# Patient Record
Sex: MALE | Race: WHITE | NOT HISPANIC OR LATINO
[De-identification: names, ages, dates, MRNs, and addresses within clinical notes are randomized per-mention and may not be internally consistent; named-entity substitution may affect disease eponyms.]

---

## 2018-09-27 ENCOUNTER — APPOINTMENT (OUTPATIENT)
Dept: ORTHOPEDIC SURGERY | Facility: CLINIC | Age: 73
End: 2018-09-27
Payer: COMMERCIAL

## 2018-09-27 VITALS
HEART RATE: 55 BPM | SYSTOLIC BLOOD PRESSURE: 125 MMHG | DIASTOLIC BLOOD PRESSURE: 85 MMHG | HEIGHT: 68 IN | WEIGHT: 160 LBS | BODY MASS INDEX: 24.25 KG/M2

## 2018-09-27 DIAGNOSIS — Z85.72 PERSONAL HISTORY OF NON-HODGKIN LYMPHOMAS: ICD-10-CM

## 2018-09-27 DIAGNOSIS — Z86.39 PERSONAL HISTORY OF OTHER ENDOCRINE, NUTRITIONAL AND METABOLIC DISEASE: ICD-10-CM

## 2018-09-27 DIAGNOSIS — Z80.9 FAMILY HISTORY OF MALIGNANT NEOPLASM, UNSPECIFIED: ICD-10-CM

## 2018-09-27 DIAGNOSIS — Z87.891 PERSONAL HISTORY OF NICOTINE DEPENDENCE: ICD-10-CM

## 2018-09-27 DIAGNOSIS — Z86.19 PERSONAL HISTORY OF OTHER INFECTIOUS AND PARASITIC DISEASES: ICD-10-CM

## 2018-09-27 PROCEDURE — 73502 X-RAY EXAM HIP UNI 2-3 VIEWS: CPT

## 2018-09-27 PROCEDURE — 99214 OFFICE O/P EST MOD 30 MIN: CPT

## 2018-09-27 PROCEDURE — 72100 X-RAY EXAM L-S SPINE 2/3 VWS: CPT

## 2018-10-29 ENCOUNTER — APPOINTMENT (OUTPATIENT)
Dept: ORTHOPEDIC SURGERY | Facility: CLINIC | Age: 73
End: 2018-10-29
Payer: COMMERCIAL

## 2018-10-29 VITALS — WEIGHT: 160 LBS | BODY MASS INDEX: 24.25 KG/M2 | HEIGHT: 68 IN

## 2018-10-29 PROCEDURE — 73502 X-RAY EXAM HIP UNI 2-3 VIEWS: CPT

## 2018-10-29 PROCEDURE — 20610 DRAIN/INJ JOINT/BURSA W/O US: CPT | Mod: RT

## 2018-10-29 PROCEDURE — 72100 X-RAY EXAM L-S SPINE 2/3 VWS: CPT

## 2018-10-29 PROCEDURE — 99214 OFFICE O/P EST MOD 30 MIN: CPT | Mod: 25

## 2018-12-12 ENCOUNTER — APPOINTMENT (OUTPATIENT)
Dept: ORTHOPEDIC SURGERY | Facility: CLINIC | Age: 73
End: 2018-12-12
Payer: COMMERCIAL

## 2018-12-12 VITALS — WEIGHT: 160 LBS | BODY MASS INDEX: 24.25 KG/M2 | HEIGHT: 68 IN

## 2018-12-12 PROCEDURE — 99213 OFFICE O/P EST LOW 20 MIN: CPT

## 2019-03-14 ENCOUNTER — APPOINTMENT (OUTPATIENT)
Dept: ORTHOPEDIC SURGERY | Facility: CLINIC | Age: 74
End: 2019-03-14
Payer: COMMERCIAL

## 2019-03-14 VITALS — HEIGHT: 68 IN | WEIGHT: 160 LBS | BODY MASS INDEX: 24.25 KG/M2

## 2019-03-14 DIAGNOSIS — M70.61 TROCHANTERIC BURSITIS, RIGHT HIP: ICD-10-CM

## 2019-03-14 PROCEDURE — 99213 OFFICE O/P EST LOW 20 MIN: CPT

## 2021-12-29 ENCOUNTER — APPOINTMENT (OUTPATIENT)
Dept: ORTHOPEDIC SURGERY | Facility: CLINIC | Age: 76
End: 2021-12-29
Payer: COMMERCIAL

## 2021-12-29 PROCEDURE — 72170 X-RAY EXAM OF PELVIS: CPT

## 2021-12-29 PROCEDURE — 72100 X-RAY EXAM L-S SPINE 2/3 VWS: CPT

## 2021-12-29 PROCEDURE — 99214 OFFICE O/P EST MOD 30 MIN: CPT

## 2021-12-29 NOTE — DISCUSSION/SUMMARY
[de-identified] : I discussed the underlying pathophysiology of the patient's condition in great detail with the patient. I went over the patient's x-rays with them in great detail. The extent of the patient’s arthritis was discussed in great detail with them. I advised him to cut back on the bowling for now. When he returns from [place], he will start a course of physical therapy for strengthening and flexibility. A prescription was provided dated 01/13/2022. All of his questions were answered. He understands and consents to the plan.\par \par FU 6 weeks.\par after undergoing PT for his low back and left hip.
none

## 2021-12-29 NOTE — PHYSICAL EXAM
[LE] : Sensory: Intact in bilateral lower extremities [de-identified] : Constitutional\par o Appearance : well-nourished, well developed, alert, in no acute distress \par Head and Face\par o Head :\par ¦ Inspection : atraumatic, normocephalic\par o Face :\par ¦ Inspection : no visible rash or discoloration\par Respiratory\par o Respiratory Effort: breathing unlabored \par Neurologic\par o Mental Status Examination :\par ¦ Orientation : alert and oriented X 3\par Psychiatric\par o Mood and Affect: mood normal, affect appropriate\par Cardiovascular\par o Observation/Palpation : - no swelling\par Lymphatic\par o Additional Nodes : No palpable lymph nodes present\par \par Lumbosacral Spine\par o Inspection : well-healed shingles lesions- right side\par o Palpation : paraspinal musculature nontender, left SI joint tenderness \par o Range of Motion : extension reproduces his pain, sidebending to the right causes left hip pain, sidebending to the right does not\par o Muscle Strength : paraspinal muscle strength and tone within normal limits\par o Muscle Tone : paraspinal muscle strength and tone within normal limits\par o Tests: straight leg test negative, moshe tests negative \par  \par Right Lower Extremity\par o Buttock : no tenderness, no swelling or deformities, well-healed shingles lesions\par o Right Hip :\par ¦ Inspection/Palpation : no trochanteric tenderness, no swelling or deformities\par ¦ Range of Motion : full flexion, limited rotation, no crepitance\par ¦ Stability : joint stability intact\par ¦ Strength : extension, flexion 5/5, adduction, abduction 5/5, internal rotation and external rotation, 5/5\par \par Left Lower Extremity\par o Buttock : no tenderness, swelling or deformities \par o Left Hip :\par ¦ Inspection/Palpation : greater trochanteric tenderness, no ITB tenderness, no swelling or deformities\par ¦ Range of Motion : full flexion, limited rotation, no crepitance\par ¦ Stability : joint stability intact\par ¦ Strength : extension, flexion 4-/5, adduction, abduction 4-/5 and painful, internal rotation and external rotation, 4-/5\par  \par Gait: kyphotic gait with an abductor lurch to the left with every step, no significant extremity swelling or lymphedema, good core strength, tight hamstrings bilaterally, equal leg lengths, normal sensation to light touch\par \par Radiology Results\par o Lumbosacral Spine : AP and lateral views were obtained and revealed diffuse degenerative disc disease worse at L2/L3 where there are significant anterior osteophytes and endplate sclerosis. Diffuse facet arthropathy with foraminal stenosis at L4/L5 and L5/S1. Peripheral osteophytes diffusely worst at L1/L2 and L2/L3. \par o Pelvis: AP pelvis was obtained and revealed mild degenerative arthritis of both hips.

## 2021-12-29 NOTE — ADDENDUM
[FreeTextEntry1] : I, Jonas Patricia, acted solely as a scribe for Dr. Martin Givens on this date 12/29/2021.\par All medical record entries made by the Scribe were at my, Dr. Martin Givens, direction and personally dictated by me on 12/29/2021. I have reviewed the chart and agree that the record accurately reflects my personal performance of the history, physical exam, assessment and plan. I have also personally directed, reviewed, and agreed with the chart.

## 2021-12-29 NOTE — HISTORY OF PRESENT ILLNESS
[de-identified] : 76 year old male presents complaining of left lateral hip pain ongoing for 2 months and chronic low back pain and tightness. He denies a specific injury. He has been bowling with a 16 pound ball. He notes occasional pain into the lateral thigh. It does not pass the knee. He denies groin pain. He denies any numbness or tingling in his legs. He notes difficulty walking long distances and ascending stairs. He denies that the pain wakes him from sleep. He has a hx of lumbar spine arthritis and stenosis and was treated with PT in the past.  Of Note: He is going to [place] from 01/06/22 to 01/12/22. \par Pmhx of DM. He is COVID boosted.

## 2022-03-14 ENCOUNTER — APPOINTMENT (OUTPATIENT)
Dept: ORTHOPEDIC SURGERY | Facility: CLINIC | Age: 77
End: 2022-03-14
Payer: COMMERCIAL

## 2022-03-14 VITALS — HEIGHT: 68 IN | BODY MASS INDEX: 23.49 KG/M2 | WEIGHT: 155 LBS

## 2022-03-14 DIAGNOSIS — M47.816 SPONDYLOSIS W/OUT MYELOPATHY OR RADICULOPATHY, LUMBAR REGION: ICD-10-CM

## 2022-03-14 PROCEDURE — 20610 DRAIN/INJ JOINT/BURSA W/O US: CPT | Mod: LT

## 2022-03-14 PROCEDURE — 99214 OFFICE O/P EST MOD 30 MIN: CPT | Mod: 25

## 2022-03-14 NOTE — DISCUSSION/SUMMARY
[de-identified] : I went over the pathophysiology of the patient's symptoms in great detail with the patient. The patient elected to receive a cortisone injection into his left hip greater trochanteric bursa today and tolerated it well. I instructed the patient on ROM exercises and told them to take it easy. The use of ice and rest was reviewed with the patient. The patient may resume activities tomorrow. I am recommending the patient continues going to physical therapy to obtain increases in his strength and mobility. A prescription was provided. If he does not improve with this injection and another month of PT an MRI will be considered. All of his questions were answered. He understands and consents to the plan.\par \par FU 1 month.\par after a left hip greater trochanteric bursa cortisone injection today (03/14/2022).\par after continuing PT for his left hip and low back.

## 2022-03-14 NOTE — HISTORY OF PRESENT ILLNESS
[de-identified] : 77 year old male presents complaining of left lateral hip pain ongoing since October, and chronic low back pain and tightness. He denies a specific injury. He has been bowling with a 16 pound ball. He notes occasional pain into the lateral thigh. It does not pass the knee. He denies groin pain. He denies any numbness or tingling in his legs. He denies that the pain wakes him from sleep. He was last seen on 12/29/21 and went to 10 PT sessions since. He is doing worse and now has to use a cane. He notes difficulty standing up from a chair and going up and down stairs. He notes his hip feels swollen.\par Pmhx of DM. He is COVID boosted. \par \par Radiology Results taken on 12/29/2021:\par o Lumbosacral Spine : AP and lateral views were obtained and revealed diffuse degenerative disc disease worse at L2/L3 where there are significant anterior osteophytes and endplate sclerosis. Diffuse facet arthropathy with foraminal stenosis at L4/L5 and L5/S1. Peripheral osteophytes diffusely worst at L1/L2 and L2/L3. \par o Pelvis: AP pelvis was obtained and revealed mild degenerative arthritis of both hips.

## 2022-03-14 NOTE — PHYSICAL EXAM
[LE] : Sensory: Intact in bilateral lower extremities [de-identified] : Constitutional\par o Appearance : well-nourished, well developed, alert, in no acute distress \par Head and Face\par o Head :\par ¦ Inspection : atraumatic, normocephalic\par o Face :\par ¦ Inspection : no visible rash or discoloration\par Respiratory\par o Respiratory Effort: breathing unlabored \par Neurologic\par o Mental Status Examination :\par ¦ Orientation : alert and oriented X 3\par Psychiatric\par o Mood and Affect: mood normal, affect appropriate\par Cardiovascular\par o Observation/Palpation : - no swelling\par Lymphatic\par o Additional Nodes : No palpable lymph nodes present\par \par Lumbosacral Spine\par o Inspection : well-healed shingles lesions- right side\par o Palpation : paraspinal musculature nontender, left SI joint and sciatic notch tenderness \par o Range of Motion : extension reproduces his pain and flexion relieves it, sidebending to the left is worse than to the right\par o Muscle Strength : paraspinal muscle strength and tone within normal limits\par o Muscle Tone : paraspinal muscle strength and tone within normal limits\par o Tests: straight leg test negative, moshe tests negative \par  \par Right Lower Extremity\par o Buttock : no tenderness, no swelling or deformities, well-healed shingles lesions\par o Right Hip :\par ¦ Inspection/Palpation : no trochanteric or ITB tenderness, no swelling or deformities\par ¦ Range of Motion : full flexion and rotation, no crepitance\par ¦ Stability : joint stability intact\par ¦ Strength : extension, flexion 5/5, adduction, abduction 5/5, internal rotation and external rotation\par \par Left Lower Extremity\par o Buttock : no tenderness, swelling or deformities \par o Left Hip :\par ¦ Inspection/Palpation : greater trochanteric tenderness, IT band tenderness, no swelling or deformities\par ¦ Range of Motion : full flexion, rotation causes pain in the left hip, no crepitance\par ¦ Stability : joint stability intact\par ¦ Strength : extension, flexion 5/5, adduction, abduction 5/5 but painful, internal rotation and external rotation\par  \par Gait: kyphotic gait with an abductor lurch to the left with every step, ambulating with a cane, no significant extremity swelling or lymphedema, good core strength, tight hamstrings bilaterally, equal leg lengths, normal sensation to light touch\par \par o Left Hip injection: Anatomic location- left hip greater trochanteric bursa, Spray - area was sterilized with Betadine and alcohol and anesthetized with Ethyl Chloride , needle used-20G, Medications given- 1.5 cc's lidocaine, 0.5cc's kenalog, 0.5 cc's dexamethasone, and patient tolerated it well.

## 2022-03-14 NOTE — ADDENDUM
[FreeTextEntry1] : I, Jonas Patricia, acted solely as a scribe for Dr. Martin Givens on this date 03/14/2022.\par All medical record entries made by the Scribe were at my, Dr. Martin Givens, direction and personally dictated by me on 03/14/2022. I have reviewed the chart and agree that the record accurately reflects my personal performance of the history, physical exam, assessment and plan. I have also personally directed, reviewed, and agreed with the chart.

## 2022-04-25 ENCOUNTER — APPOINTMENT (OUTPATIENT)
Dept: ORTHOPEDIC SURGERY | Facility: CLINIC | Age: 77
End: 2022-04-25
Payer: COMMERCIAL

## 2022-04-25 DIAGNOSIS — M48.061 SPINAL STENOSIS, LUMBAR REGION WITHOUT NEUROGENIC CLAUDICATION: ICD-10-CM

## 2022-04-25 PROCEDURE — 99213 OFFICE O/P EST LOW 20 MIN: CPT

## 2022-07-17 ENCOUNTER — INPATIENT (INPATIENT)
Facility: HOSPITAL | Age: 77
LOS: 2 days | Discharge: HOME | DRG: 690 | End: 2022-07-20
Attending: FAMILY MEDICINE | Admitting: HOSPITALIST
Payer: MEDICARE

## 2022-07-17 VITALS
HEART RATE: 100 BPM | WEIGHT: 154.98 LBS | OXYGEN SATURATION: 92 % | TEMPERATURE: 101 F | SYSTOLIC BLOOD PRESSURE: 129 MMHG | RESPIRATION RATE: 20 BRPM | DIASTOLIC BLOOD PRESSURE: 73 MMHG

## 2022-07-17 DIAGNOSIS — Z98.89 OTHER SPECIFIED POSTPROCEDURAL STATES: Chronic | ICD-10-CM

## 2022-07-17 DIAGNOSIS — J18.9 PNEUMONIA, UNSPECIFIED ORGANISM: ICD-10-CM

## 2022-07-17 LAB
ALBUMIN SERPL ELPH-MCNC: 3.8 G/DL — SIGNIFICANT CHANGE UP (ref 3.3–5.2)
ALP SERPL-CCNC: 94 U/L — SIGNIFICANT CHANGE UP (ref 40–120)
ALT FLD-CCNC: 26 U/L — SIGNIFICANT CHANGE UP
ANION GAP SERPL CALC-SCNC: 16 MMOL/L — SIGNIFICANT CHANGE UP (ref 5–17)
APPEARANCE UR: CLEAR — SIGNIFICANT CHANGE UP
AST SERPL-CCNC: 33 U/L — SIGNIFICANT CHANGE UP
BACTERIA # UR AUTO: ABNORMAL
BASOPHILS # BLD AUTO: 0.03 K/UL — SIGNIFICANT CHANGE UP (ref 0–0.2)
BASOPHILS NFR BLD AUTO: 0.3 % — SIGNIFICANT CHANGE UP (ref 0–2)
BILIRUB SERPL-MCNC: 0.7 MG/DL — SIGNIFICANT CHANGE UP (ref 0.4–2)
BILIRUB UR-MCNC: NEGATIVE — SIGNIFICANT CHANGE UP
BUN SERPL-MCNC: 27.9 MG/DL — HIGH (ref 8–20)
CALCIUM SERPL-MCNC: 9.8 MG/DL — SIGNIFICANT CHANGE UP (ref 8.6–10.2)
CHLORIDE SERPL-SCNC: 95 MMOL/L — LOW (ref 98–107)
CO2 SERPL-SCNC: 20 MMOL/L — LOW (ref 22–29)
COLOR SPEC: YELLOW — SIGNIFICANT CHANGE UP
CREAT SERPL-MCNC: 1.74 MG/DL — HIGH (ref 0.5–1.3)
DIFF PNL FLD: ABNORMAL
EGFR: 39 ML/MIN/1.73M2 — LOW
EOSINOPHIL # BLD AUTO: 0.02 K/UL — SIGNIFICANT CHANGE UP (ref 0–0.5)
EOSINOPHIL NFR BLD AUTO: 0.2 % — SIGNIFICANT CHANGE UP (ref 0–6)
EPI CELLS # UR: SIGNIFICANT CHANGE UP
GLUCOSE SERPL-MCNC: 154 MG/DL — HIGH (ref 70–99)
GLUCOSE UR QL: 1000 MG/DL
HCT VFR BLD CALC: 42.5 % — SIGNIFICANT CHANGE UP (ref 39–50)
HGB BLD-MCNC: 13.7 G/DL — SIGNIFICANT CHANGE UP (ref 13–17)
HMPV RNA SPEC QL NAA+PROBE: DETECTED
IMM GRANULOCYTES NFR BLD AUTO: 0.6 % — SIGNIFICANT CHANGE UP (ref 0–1.5)
KETONES UR-MCNC: ABNORMAL
LACTATE BLDV-MCNC: 1.3 MMOL/L — SIGNIFICANT CHANGE UP (ref 0.5–2)
LEUKOCYTE ESTERASE UR-ACNC: ABNORMAL
LIDOCAIN IGE QN: 22 U/L — SIGNIFICANT CHANGE UP (ref 22–51)
LYMPHOCYTES # BLD AUTO: 0.81 K/UL — LOW (ref 1–3.3)
LYMPHOCYTES # BLD AUTO: 8 % — LOW (ref 13–44)
MAGNESIUM SERPL-MCNC: 2.1 MG/DL — SIGNIFICANT CHANGE UP (ref 1.8–2.6)
MCHC RBC-ENTMCNC: 28.9 PG — SIGNIFICANT CHANGE UP (ref 27–34)
MCHC RBC-ENTMCNC: 32.2 GM/DL — SIGNIFICANT CHANGE UP (ref 32–36)
MCV RBC AUTO: 89.7 FL — SIGNIFICANT CHANGE UP (ref 80–100)
MONOCYTES # BLD AUTO: 0.78 K/UL — SIGNIFICANT CHANGE UP (ref 0–0.9)
MONOCYTES NFR BLD AUTO: 7.7 % — SIGNIFICANT CHANGE UP (ref 2–14)
NEUTROPHILS # BLD AUTO: 8.38 K/UL — HIGH (ref 1.8–7.4)
NEUTROPHILS NFR BLD AUTO: 83.2 % — HIGH (ref 43–77)
NITRITE UR-MCNC: NEGATIVE — SIGNIFICANT CHANGE UP
NT-PROBNP SERPL-SCNC: 716 PG/ML — HIGH (ref 0–300)
PH UR: 6 — SIGNIFICANT CHANGE UP (ref 5–8)
PLATELET # BLD AUTO: 242 K/UL — SIGNIFICANT CHANGE UP (ref 150–400)
POTASSIUM SERPL-MCNC: 4.6 MMOL/L — SIGNIFICANT CHANGE UP (ref 3.5–5.3)
POTASSIUM SERPL-SCNC: 4.6 MMOL/L — SIGNIFICANT CHANGE UP (ref 3.5–5.3)
PROT SERPL-MCNC: 7.6 G/DL — SIGNIFICANT CHANGE UP (ref 6.6–8.7)
PROT UR-MCNC: 30 MG/DL
RAPID RVP RESULT: DETECTED
RBC # BLD: 4.74 M/UL — SIGNIFICANT CHANGE UP (ref 4.2–5.8)
RBC # FLD: 13.7 % — SIGNIFICANT CHANGE UP (ref 10.3–14.5)
RBC CASTS # UR COMP ASSIST: ABNORMAL /HPF (ref 0–4)
SARS-COV-2 RNA SPEC QL NAA+PROBE: SIGNIFICANT CHANGE UP
SODIUM SERPL-SCNC: 131 MMOL/L — LOW (ref 135–145)
SP GR SPEC: 1.01 — SIGNIFICANT CHANGE UP (ref 1.01–1.02)
TROPONIN T SERPL-MCNC: <0.01 NG/ML — SIGNIFICANT CHANGE UP (ref 0–0.06)
UROBILINOGEN FLD QL: NEGATIVE MG/DL — SIGNIFICANT CHANGE UP
WBC # BLD: 10.08 K/UL — SIGNIFICANT CHANGE UP (ref 3.8–10.5)
WBC # FLD AUTO: 10.08 K/UL — SIGNIFICANT CHANGE UP (ref 3.8–10.5)
WBC UR QL: ABNORMAL /HPF (ref 0–5)

## 2022-07-17 PROCEDURE — 71045 X-RAY EXAM CHEST 1 VIEW: CPT | Mod: 26

## 2022-07-17 PROCEDURE — 99285 EMERGENCY DEPT VISIT HI MDM: CPT

## 2022-07-17 PROCEDURE — 99223 1ST HOSP IP/OBS HIGH 75: CPT

## 2022-07-17 RX ORDER — DEXTROSE 50 % IN WATER 50 %
25 SYRINGE (ML) INTRAVENOUS ONCE
Refills: 0 | Status: DISCONTINUED | OUTPATIENT
Start: 2022-07-17 | End: 2022-07-20

## 2022-07-17 RX ORDER — SODIUM CHLORIDE 9 MG/ML
500 INJECTION INTRAMUSCULAR; INTRAVENOUS; SUBCUTANEOUS ONCE
Refills: 0 | Status: COMPLETED | OUTPATIENT
Start: 2022-07-17 | End: 2022-07-17

## 2022-07-17 RX ORDER — SODIUM CHLORIDE 9 MG/ML
1000 INJECTION, SOLUTION INTRAVENOUS
Refills: 0 | Status: DISCONTINUED | OUTPATIENT
Start: 2022-07-17 | End: 2022-07-20

## 2022-07-17 RX ORDER — DEXTROSE 50 % IN WATER 50 %
15 SYRINGE (ML) INTRAVENOUS ONCE
Refills: 0 | Status: DISCONTINUED | OUTPATIENT
Start: 2022-07-17 | End: 2022-07-20

## 2022-07-17 RX ORDER — ONDANSETRON 8 MG/1
4 TABLET, FILM COATED ORAL EVERY 8 HOURS
Refills: 0 | Status: DISCONTINUED | OUTPATIENT
Start: 2022-07-17 | End: 2022-07-20

## 2022-07-17 RX ORDER — INSULIN LISPRO 100/ML
VIAL (ML) SUBCUTANEOUS
Refills: 0 | Status: DISCONTINUED | OUTPATIENT
Start: 2022-07-17 | End: 2022-07-20

## 2022-07-17 RX ORDER — DEXTROSE 50 % IN WATER 50 %
12.5 SYRINGE (ML) INTRAVENOUS ONCE
Refills: 0 | Status: DISCONTINUED | OUTPATIENT
Start: 2022-07-17 | End: 2022-07-20

## 2022-07-17 RX ORDER — ENOXAPARIN SODIUM 100 MG/ML
40 INJECTION SUBCUTANEOUS EVERY 24 HOURS
Refills: 0 | Status: DISCONTINUED | OUTPATIENT
Start: 2022-07-17 | End: 2022-07-20

## 2022-07-17 RX ORDER — GLUCAGON INJECTION, SOLUTION 0.5 MG/.1ML
1 INJECTION, SOLUTION SUBCUTANEOUS ONCE
Refills: 0 | Status: DISCONTINUED | OUTPATIENT
Start: 2022-07-17 | End: 2022-07-20

## 2022-07-17 RX ORDER — AZITHROMYCIN 500 MG/1
500 TABLET, FILM COATED ORAL ONCE
Refills: 0 | Status: COMPLETED | OUTPATIENT
Start: 2022-07-17 | End: 2022-07-17

## 2022-07-17 RX ORDER — CEFTRIAXONE 500 MG/1
1000 INJECTION, POWDER, FOR SOLUTION INTRAMUSCULAR; INTRAVENOUS ONCE
Refills: 0 | Status: COMPLETED | OUTPATIENT
Start: 2022-07-17 | End: 2022-07-17

## 2022-07-17 RX ORDER — ACETAMINOPHEN 500 MG
650 TABLET ORAL EVERY 6 HOURS
Refills: 0 | Status: DISCONTINUED | OUTPATIENT
Start: 2022-07-17 | End: 2022-07-20

## 2022-07-17 RX ORDER — LANOLIN ALCOHOL/MO/W.PET/CERES
3 CREAM (GRAM) TOPICAL AT BEDTIME
Refills: 0 | Status: DISCONTINUED | OUTPATIENT
Start: 2022-07-17 | End: 2022-07-20

## 2022-07-17 RX ORDER — ACETAMINOPHEN 500 MG
1000 TABLET ORAL ONCE
Refills: 0 | Status: COMPLETED | OUTPATIENT
Start: 2022-07-17 | End: 2022-07-17

## 2022-07-17 RX ADMIN — Medication 400 MILLIGRAM(S): at 14:58

## 2022-07-17 RX ADMIN — AZITHROMYCIN 255 MILLIGRAM(S): 500 TABLET, FILM COATED ORAL at 18:40

## 2022-07-17 RX ADMIN — ENOXAPARIN SODIUM 40 MILLIGRAM(S): 100 INJECTION SUBCUTANEOUS at 22:40

## 2022-07-17 RX ADMIN — SODIUM CHLORIDE 500 MILLILITER(S): 9 INJECTION INTRAMUSCULAR; INTRAVENOUS; SUBCUTANEOUS at 20:41

## 2022-07-17 RX ADMIN — CEFTRIAXONE 100 MILLIGRAM(S): 500 INJECTION, POWDER, FOR SOLUTION INTRAMUSCULAR; INTRAVENOUS at 18:10

## 2022-07-17 NOTE — H&P ADULT - HISTORY OF PRESENT ILLNESS
78yo M with PMHx of DM, HTN, lymphoma presented to ED c/o dry cough with associated generalized weakness. Patient states he is unable to stand o his legs due to severe generalized weakness that cause him to fall twice in the past 2 days. Denies LOC, head trauma. States that he has been dealing with a cough for the past couple of 3 weeks and was prescribed  steroids without relieve. Denies cp, sob, palpitations, fever or chills. In the ED febrile 101.3 transient hypoxia with  ambulation in the ED. Labs pertinent for +hMPV, cr 1.7, BUN 27.9, CXR ?concern for pneumonia but pt states that he has old scaring from "walking" pneumonia he had years ago.

## 2022-07-17 NOTE — ED PROVIDER NOTE - CLINICAL SUMMARY MEDICAL DECISION MAKING FREE TEXT BOX
76 y/o M with PMHx HTN, HLD, DM, who presents to the ED c/o lower extremity weakness. Basic labs, trop, UA, CXR, EKG, tylenol, reassess.

## 2022-07-17 NOTE — ED PROVIDER NOTE - OBJECTIVE STATEMENT
76 y/o M with PMHx HTN, HLD, DM, who presents to the ED c/o lower extremity weakness. Patient states that last night he got up to go use the bathroom when he felt his legs turn weak and give out underneath him resulting in him falling to the floor. States that a second similar episode also happened this morning. Denies any head-strike or other injuries from the fall. States that he has been dealing with a cough productive of clear sputum for the past couple of weeks and is on steroids. Denies any fevers or chills. States that nothing similar has ever happened to him before. Denies any saddle anesthesia or urinary incontinence. States that he is vaccinated for COVID. Denies any other complaints at this time.

## 2022-07-17 NOTE — ED PROVIDER NOTE - ATTENDING CONTRIBUTION TO CARE
Patient seen with resident.  VSS but new onset hypoxemia and generalized weakness.  Otherwise baseline.  No head injury.  Patient given O2 and will give IV abx and maintain on new supplemental O2.  UA with UTI.  Lab values with metabolic derangement.  d/w hospitalist and will admit

## 2022-07-17 NOTE — ED ADULT NURSE NOTE - NSIMPLEMENTINTERV_GEN_ALL_ED
Implemented All Fall Risk Interventions:  [place] to call system. Call bell, personal items and telephone within reach. Instruct patient to call for assistance. Room bathroom lighting operational. Non-slip footwear when patient is off stretcher. Physically safe environment: no spills, clutter or unnecessary equipment. Stretcher in lowest position, wheels locked, appropriate side rails in place. Provide visual cue, wrist band, yellow gown, etc. Monitor gait and stability. Monitor for mental status changes and reorient to person, place, and time. Review medications for side effects contributing to fall risk. Reinforce activity limits and safety measures with patient and family.

## 2022-07-17 NOTE — ED ADULT NURSE REASSESSMENT NOTE - NS ED NURSE REASSESS COMMENT FT1
Assumed care of pt at 19:15 as stated in report from MARYAM Hayes Charting as noted. Patient A&O x4, denies pain/discomfort, denies CP/SOB. Updated on the plan of care. Call bell within reach, bed locked in lowest position. IV site flushed w/ NS. No redness, swelling or pain noted to site. No signs of acute distress noted, safety maintained. Pt remains on CM in NSR 82. Pt ate a sandwich, and is now awaiting CDU bed placement

## 2022-07-17 NOTE — ED ADULT TRIAGE NOTE - CHIEF COMPLAINT QUOTE
tried to get out of bed last night, unable to get up. weakness equal bilaterally. also c/o cough, denies fever.

## 2022-07-17 NOTE — ED ADULT NURSE NOTE - OBJECTIVE STATEMENT
Assumed care at 1450 pt co difficulty ambulating since last night, states his legs felt like jelly, pt also was seen by urgent care for a cough that is persistent, pt placed on steroids for 2 weeks with no improvement. pt placed on cardiac monitor and pulse ox, denies any SOB, chest pain.

## 2022-07-17 NOTE — PATIENT PROFILE ADULT - FALL HARM RISK - HARM RISK INTERVENTIONS

## 2022-07-17 NOTE — H&P ADULT - NSHPPHYSICALEXAM_GEN_ALL_CORE
T(C): 37.5 (07-17-22 @ 21:24), Max: 38.5 (07-17-22 @ 13:47)  HR: 91 (07-17-22 @ 21:24) (82 - 100)  BP: 112/65 (07-17-22 @ 21:24) (112/65 - 129/73)  RR: 16 (07-17-22 @ 21:24) (16 - 20)  SpO2: 94% (07-17-22 @ 21:24) (92% - 95%)    GENERAL: patient appears well, no acute distress, appropriate, pleasant  EYES: sclera clear, no exudates  ENMT: oropharynx clear without erythema, no exudates, moist mucous membranes  NECK: supple, soft, no thyromegaly noted  LUNGS: good air entry bilaterally, clear to auscultation, symmetric breath sounds, no wheezing or rhonchi appreciated  HEART: soft S1/S2, regular rate and rhythm, no murmurs noted, no lower extremity edema  GASTROINTESTINAL: abdomen is soft, nontender, nondistended, normoactive bowel sounds, no palpable masses  INTEGUMENT: good skin turgor, warm skin, appears well perfused  MUSCULOSKELETAL: no clubbing or cyanosis, no obvious deformity  NEUROLOGIC: awake, alert, oriented x3, good muscle tone in 4 extremities, no obvious sensory deficits  PSYCHIATRIC: mood is good, affect is congruent, linear and logical thought process  HEME/LYMPH: no palpable supraclavicular nodules, no obvious ecchymosis or petechiae

## 2022-07-17 NOTE — H&P ADULT - ASSESSMENT
76yo M with PMHx of DM, HTN, lymphoma presented to ED c/o dry cough with associated generalized weakness.    Generalized weaknesses  -unable to ambulate due to acute viral illness   -RVP +hMPV  -febrile, no leukocytosis   -UA with mild wbc in the urine  -received ceftriaxone and Zithromax in the ED   -will hold off abx for now, pending urine cx   -cont with supportive care     ARF  -Cr 1.74, unknown baseline   -likely due to dehydration   -gentle IVF hydration   -check repeat bmp     HTN  -cont losartan     DM   -ISS, hypoglycemic protocol     DVT ppx  Lovenox

## 2022-07-18 DIAGNOSIS — Z98.890 OTHER SPECIFIED POSTPROCEDURAL STATES: Chronic | ICD-10-CM

## 2022-07-18 LAB
A1C WITH ESTIMATED AVERAGE GLUCOSE RESULT: 7.4 % — HIGH (ref 4–5.6)
ANION GAP SERPL CALC-SCNC: 14 MMOL/L — SIGNIFICANT CHANGE UP (ref 5–17)
BASOPHILS # BLD AUTO: 0.02 K/UL — SIGNIFICANT CHANGE UP (ref 0–0.2)
BASOPHILS NFR BLD AUTO: 0.2 % — SIGNIFICANT CHANGE UP (ref 0–2)
BUN SERPL-MCNC: 27.4 MG/DL — HIGH (ref 8–20)
CALCIUM SERPL-MCNC: 9.4 MG/DL — SIGNIFICANT CHANGE UP (ref 8.6–10.2)
CHLORIDE SERPL-SCNC: 99 MMOL/L — SIGNIFICANT CHANGE UP (ref 98–107)
CO2 SERPL-SCNC: 21 MMOL/L — LOW (ref 22–29)
CREAT SERPL-MCNC: 1.56 MG/DL — HIGH (ref 0.5–1.3)
EGFR: 45 ML/MIN/1.73M2 — LOW
EOSINOPHIL # BLD AUTO: 0.04 K/UL — SIGNIFICANT CHANGE UP (ref 0–0.5)
EOSINOPHIL NFR BLD AUTO: 0.4 % — SIGNIFICANT CHANGE UP (ref 0–6)
ESTIMATED AVERAGE GLUCOSE: 166 MG/DL — HIGH (ref 68–114)
GLUCOSE BLDC GLUCOMTR-MCNC: 101 MG/DL — HIGH (ref 70–99)
GLUCOSE BLDC GLUCOMTR-MCNC: 117 MG/DL — HIGH (ref 70–99)
GLUCOSE BLDC GLUCOMTR-MCNC: 119 MG/DL — HIGH (ref 70–99)
GLUCOSE SERPL-MCNC: 112 MG/DL — HIGH (ref 70–99)
HCT VFR BLD CALC: 38.8 % — LOW (ref 39–50)
HGB BLD-MCNC: 12.5 G/DL — LOW (ref 13–17)
IMM GRANULOCYTES NFR BLD AUTO: 0.5 % — SIGNIFICANT CHANGE UP (ref 0–1.5)
LYMPHOCYTES # BLD AUTO: 1.03 K/UL — SIGNIFICANT CHANGE UP (ref 1–3.3)
LYMPHOCYTES # BLD AUTO: 9.7 % — LOW (ref 13–44)
MCHC RBC-ENTMCNC: 28.8 PG — SIGNIFICANT CHANGE UP (ref 27–34)
MCHC RBC-ENTMCNC: 32.2 GM/DL — SIGNIFICANT CHANGE UP (ref 32–36)
MCV RBC AUTO: 89.4 FL — SIGNIFICANT CHANGE UP (ref 80–100)
MONOCYTES # BLD AUTO: 0.76 K/UL — SIGNIFICANT CHANGE UP (ref 0–0.9)
MONOCYTES NFR BLD AUTO: 7.2 % — SIGNIFICANT CHANGE UP (ref 2–14)
NEUTROPHILS # BLD AUTO: 8.69 K/UL — HIGH (ref 1.8–7.4)
NEUTROPHILS NFR BLD AUTO: 82 % — HIGH (ref 43–77)
PLATELET # BLD AUTO: 227 K/UL — SIGNIFICANT CHANGE UP (ref 150–400)
POTASSIUM SERPL-MCNC: 3.9 MMOL/L — SIGNIFICANT CHANGE UP (ref 3.5–5.3)
POTASSIUM SERPL-SCNC: 3.9 MMOL/L — SIGNIFICANT CHANGE UP (ref 3.5–5.3)
RBC # BLD: 4.34 M/UL — SIGNIFICANT CHANGE UP (ref 4.2–5.8)
RBC # FLD: 13.6 % — SIGNIFICANT CHANGE UP (ref 10.3–14.5)
SODIUM SERPL-SCNC: 134 MMOL/L — LOW (ref 135–145)
WBC # BLD: 10.59 K/UL — HIGH (ref 3.8–10.5)
WBC # FLD AUTO: 10.59 K/UL — HIGH (ref 3.8–10.5)

## 2022-07-18 PROCEDURE — 93010 ELECTROCARDIOGRAM REPORT: CPT

## 2022-07-18 PROCEDURE — 99233 SBSQ HOSP IP/OBS HIGH 50: CPT

## 2022-07-18 RX ORDER — SODIUM CHLORIDE 9 MG/ML
1000 INJECTION INTRAMUSCULAR; INTRAVENOUS; SUBCUTANEOUS
Refills: 0 | Status: DISCONTINUED | OUTPATIENT
Start: 2022-07-18 | End: 2022-07-20

## 2022-07-18 RX ORDER — FLUTICASONE PROPIONATE 50 MCG
1 SPRAY, SUSPENSION NASAL
Refills: 0 | Status: DISCONTINUED | OUTPATIENT
Start: 2022-07-18 | End: 2022-07-20

## 2022-07-18 RX ORDER — PANTOPRAZOLE SODIUM 20 MG/1
40 TABLET, DELAYED RELEASE ORAL
Refills: 0 | Status: DISCONTINUED | OUTPATIENT
Start: 2022-07-18 | End: 2022-07-20

## 2022-07-18 RX ORDER — CEFTRIAXONE 500 MG/1
1000 INJECTION, POWDER, FOR SOLUTION INTRAMUSCULAR; INTRAVENOUS EVERY 24 HOURS
Refills: 0 | Status: DISCONTINUED | OUTPATIENT
Start: 2022-07-18 | End: 2022-07-20

## 2022-07-18 RX ORDER — SODIUM CHLORIDE 0.65 %
1 AEROSOL, SPRAY (ML) NASAL
Refills: 0 | Status: DISCONTINUED | OUTPATIENT
Start: 2022-07-18 | End: 2022-07-20

## 2022-07-18 RX ORDER — SIMVASTATIN 20 MG/1
10 TABLET, FILM COATED ORAL AT BEDTIME
Refills: 0 | Status: DISCONTINUED | OUTPATIENT
Start: 2022-07-18 | End: 2022-07-20

## 2022-07-18 RX ORDER — LOSARTAN POTASSIUM 100 MG/1
100 TABLET, FILM COATED ORAL DAILY
Refills: 0 | Status: DISCONTINUED | OUTPATIENT
Start: 2022-07-18 | End: 2022-07-20

## 2022-07-18 RX ADMIN — ENOXAPARIN SODIUM 40 MILLIGRAM(S): 100 INJECTION SUBCUTANEOUS at 22:12

## 2022-07-18 RX ADMIN — Medication 1200 MILLIGRAM(S): at 17:51

## 2022-07-18 RX ADMIN — SODIUM CHLORIDE 100 MILLILITER(S): 9 INJECTION INTRAMUSCULAR; INTRAVENOUS; SUBCUTANEOUS at 22:13

## 2022-07-18 RX ADMIN — PANTOPRAZOLE SODIUM 40 MILLIGRAM(S): 20 TABLET, DELAYED RELEASE ORAL at 05:49

## 2022-07-18 RX ADMIN — SODIUM CHLORIDE 100 MILLILITER(S): 9 INJECTION INTRAMUSCULAR; INTRAVENOUS; SUBCUTANEOUS at 08:58

## 2022-07-18 RX ADMIN — LOSARTAN POTASSIUM 100 MILLIGRAM(S): 100 TABLET, FILM COATED ORAL at 05:48

## 2022-07-18 RX ADMIN — CEFTRIAXONE 100 MILLIGRAM(S): 500 INJECTION, POWDER, FOR SOLUTION INTRAMUSCULAR; INTRAVENOUS at 08:58

## 2022-07-18 RX ADMIN — Medication 650 MILLIGRAM(S): at 18:36

## 2022-07-18 RX ADMIN — SIMVASTATIN 10 MILLIGRAM(S): 20 TABLET, FILM COATED ORAL at 22:10

## 2022-07-18 RX ADMIN — Medication 1 SPRAY(S): at 17:52

## 2022-07-18 RX ADMIN — Medication 650 MILLIGRAM(S): at 19:26

## 2022-07-18 RX ADMIN — SODIUM CHLORIDE 60 MILLILITER(S): 9 INJECTION INTRAMUSCULAR; INTRAVENOUS; SUBCUTANEOUS at 03:03

## 2022-07-19 LAB
ANION GAP SERPL CALC-SCNC: 13 MMOL/L — SIGNIFICANT CHANGE UP (ref 5–17)
BASOPHILS # BLD AUTO: 0.01 K/UL — SIGNIFICANT CHANGE UP (ref 0–0.2)
BASOPHILS NFR BLD AUTO: 0.1 % — SIGNIFICANT CHANGE UP (ref 0–2)
BUN SERPL-MCNC: 30.3 MG/DL — HIGH (ref 8–20)
CALCIUM SERPL-MCNC: 9.5 MG/DL — SIGNIFICANT CHANGE UP (ref 8.6–10.2)
CHLORIDE SERPL-SCNC: 101 MMOL/L — SIGNIFICANT CHANGE UP (ref 98–107)
CO2 SERPL-SCNC: 22 MMOL/L — SIGNIFICANT CHANGE UP (ref 22–29)
CREAT SERPL-MCNC: 1.58 MG/DL — HIGH (ref 0.5–1.3)
EGFR: 44 ML/MIN/1.73M2 — LOW
EOSINOPHIL # BLD AUTO: 0.16 K/UL — SIGNIFICANT CHANGE UP (ref 0–0.5)
EOSINOPHIL NFR BLD AUTO: 2.1 % — SIGNIFICANT CHANGE UP (ref 0–6)
GLUCOSE BLDC GLUCOMTR-MCNC: 102 MG/DL — HIGH (ref 70–99)
GLUCOSE BLDC GLUCOMTR-MCNC: 125 MG/DL — HIGH (ref 70–99)
GLUCOSE BLDC GLUCOMTR-MCNC: 126 MG/DL — HIGH (ref 70–99)
GLUCOSE BLDC GLUCOMTR-MCNC: 83 MG/DL — SIGNIFICANT CHANGE UP (ref 70–99)
GLUCOSE SERPL-MCNC: 90 MG/DL — SIGNIFICANT CHANGE UP (ref 70–99)
HCT VFR BLD CALC: 38.5 % — LOW (ref 39–50)
HGB BLD-MCNC: 12.2 G/DL — LOW (ref 13–17)
IMM GRANULOCYTES NFR BLD AUTO: 0.5 % — SIGNIFICANT CHANGE UP (ref 0–1.5)
LYMPHOCYTES # BLD AUTO: 1.03 K/UL — SIGNIFICANT CHANGE UP (ref 1–3.3)
LYMPHOCYTES # BLD AUTO: 13.6 % — SIGNIFICANT CHANGE UP (ref 13–44)
MAGNESIUM SERPL-MCNC: 2.2 MG/DL — SIGNIFICANT CHANGE UP (ref 1.8–2.6)
MCHC RBC-ENTMCNC: 28.8 PG — SIGNIFICANT CHANGE UP (ref 27–34)
MCHC RBC-ENTMCNC: 31.7 GM/DL — LOW (ref 32–36)
MCV RBC AUTO: 90.8 FL — SIGNIFICANT CHANGE UP (ref 80–100)
MONOCYTES # BLD AUTO: 0.65 K/UL — SIGNIFICANT CHANGE UP (ref 0–0.9)
MONOCYTES NFR BLD AUTO: 8.6 % — SIGNIFICANT CHANGE UP (ref 2–14)
NEUTROPHILS # BLD AUTO: 5.69 K/UL — SIGNIFICANT CHANGE UP (ref 1.8–7.4)
NEUTROPHILS NFR BLD AUTO: 75.1 % — SIGNIFICANT CHANGE UP (ref 43–77)
PLATELET # BLD AUTO: 205 K/UL — SIGNIFICANT CHANGE UP (ref 150–400)
POTASSIUM SERPL-MCNC: 4.4 MMOL/L — SIGNIFICANT CHANGE UP (ref 3.5–5.3)
POTASSIUM SERPL-SCNC: 4.4 MMOL/L — SIGNIFICANT CHANGE UP (ref 3.5–5.3)
RBC # BLD: 4.24 M/UL — SIGNIFICANT CHANGE UP (ref 4.2–5.8)
RBC # FLD: 13.7 % — SIGNIFICANT CHANGE UP (ref 10.3–14.5)
SODIUM SERPL-SCNC: 136 MMOL/L — SIGNIFICANT CHANGE UP (ref 135–145)
WBC # BLD: 7.58 K/UL — SIGNIFICANT CHANGE UP (ref 3.8–10.5)
WBC # FLD AUTO: 7.58 K/UL — SIGNIFICANT CHANGE UP (ref 3.8–10.5)

## 2022-07-19 PROCEDURE — 99233 SBSQ HOSP IP/OBS HIGH 50: CPT

## 2022-07-19 RX ADMIN — Medication 1 SPRAY(S): at 17:09

## 2022-07-19 RX ADMIN — Medication 1200 MILLIGRAM(S): at 17:09

## 2022-07-19 RX ADMIN — CEFTRIAXONE 100 MILLIGRAM(S): 500 INJECTION, POWDER, FOR SOLUTION INTRAMUSCULAR; INTRAVENOUS at 08:54

## 2022-07-19 RX ADMIN — SIMVASTATIN 10 MILLIGRAM(S): 20 TABLET, FILM COATED ORAL at 22:38

## 2022-07-19 RX ADMIN — SODIUM CHLORIDE 100 MILLILITER(S): 9 INJECTION INTRAMUSCULAR; INTRAVENOUS; SUBCUTANEOUS at 08:54

## 2022-07-19 RX ADMIN — LOSARTAN POTASSIUM 100 MILLIGRAM(S): 100 TABLET, FILM COATED ORAL at 05:48

## 2022-07-19 RX ADMIN — ENOXAPARIN SODIUM 40 MILLIGRAM(S): 100 INJECTION SUBCUTANEOUS at 22:38

## 2022-07-19 RX ADMIN — PANTOPRAZOLE SODIUM 40 MILLIGRAM(S): 20 TABLET, DELAYED RELEASE ORAL at 05:48

## 2022-07-19 RX ADMIN — Medication 1200 MILLIGRAM(S): at 05:48

## 2022-07-19 NOTE — PROGRESS NOTE ADULT - SUBJECTIVE AND OBJECTIVE BOX
Pneumonia due to organism    HPI:  76yo M with PMHx of DM, HTN, lymphoma presented to ED c/o dry cough with associated generalized weakness. Patient states he is unable to stand o his legs due to severe generalized weakness that cause him to fall twice in the past 2 days. Denies LOC, head trauma. States that he has been dealing with a cough for the past couple of 3 weeks and was prescribed  steroids without relieve. Denies cp, sob, palpitations, fever or chills. In the ED febrile 101.3 transient hypoxia with  ambulation in the ED. Labs pertinent for +hMPV, cr 1.7, BUN 27.9, CXR ?concern for pneumonia but pt states that he has old scaring from "walking" pneumonia he had years ago.    (17 Jul 2022 20:24)    Interval History:  Patient was seen and examined at bedside around 10:15 am.  Feels much better today.    Denies chest pain, palpitations, shortness of breath, headache, dizziness, visual symptoms, nausea, vomiting, abdominal pain or urinary symptoms.     ROS:  As per interval history otherwise unremarkable.    PHYSICAL EXAM:  Vital Signs   T(C): 37.7 (19 Jul 2022 15:19), Max: 37.7 (19 Jul 2022 15:19)  T(F): 99.8 (19 Jul 2022 15:19), Max: 99.8 (19 Jul 2022 15:19)  HR: 79 (19 Jul 2022 15:19) (62 - 84)  BP: 144/67 (19 Jul 2022 15:19) (131/65 - 144/67)  RR: 18 (19 Jul 2022 15:19) (18 - 18)  SpO2: 96% (19 Jul 2022 15:19) (95% - 96%)  General: Elderly male sitting in bed comfortably. No acute distress  HEENT: EOMI. Clear conjunctivae. Moist mucus membrane  Neck: Supple.   Chest: Good air entry. No wheezing, rales or rhonchi. No chest wall tenderness.  Heart: Normal S1 & S2. RRR.   Abdomen: Soft. Non-tender. Non-distended. + BS  Ext: No pedal edema. No calf tenderness   Neuro: AAO x 3. No focal deficit. No speech disorder  Skin: Warm and Dry  Psychiatry: Normal mood and affect    I&O's Summary    18 Jul 2022 07:01 - 19 Jul 2022 07:00  --------------------------------------------------------  IN: 0 mL / OUT: 200 mL / NET: -200 mL    LABS:  CAPILLARY BLOOD GLUCOSE  POCT Blood Glucose.: 102 mg/dL (19 Jul 2022 17:04)  POCT Blood Glucose.: 125 mg/dL (19 Jul 2022 11:20)  POCT Blood Glucose.: 83 mg/dL (19 Jul 2022 08:52)  POCT Blood Glucose.: 117 mg/dL (18 Jul 2022 18:41)                      12.2   7.58  )-----------( 205      ( 19 Jul 2022 04:09 )             38.5     07-19    136  |  101  |  30.3<H>  ----------------------------<  90  4.4   |  22.0  |  1.58<H>    Ca    9.5      19 Jul 2022 04:09  Mg     2.2     07-19    RADIOLOGY & ADDITIONAL STUDIES:  Reviewed     MEDICATIONS  (STANDING):  cefTRIAXone   IVPB 1000 milliGRAM(s) IV Intermittent every 24 hours  dextrose 5%. 1000 milliLiter(s) (100 mL/Hr) IV Continuous <Continuous>  dextrose 5%. 1000 milliLiter(s) (50 mL/Hr) IV Continuous <Continuous>  dextrose 50% Injectable 25 Gram(s) IV Push once  dextrose 50% Injectable 12.5 Gram(s) IV Push once  dextrose 50% Injectable 25 Gram(s) IV Push once  enoxaparin Injectable 40 milliGRAM(s) SubCutaneous every 24 hours  fluticasone propionate 50 MICROgram(s)/spray Nasal Spray 1 Spray(s) Both Nostrils two times a day  glucagon  Injectable 1 milliGRAM(s) IntraMuscular once  guaiFENesin ER 1200 milliGRAM(s) Oral every 12 hours  insulin lispro (ADMELOG) corrective regimen sliding scale   SubCutaneous three times a day before meals  losartan 100 milliGRAM(s) Oral daily  pantoprazole    Tablet 40 milliGRAM(s) Oral before breakfast  simvastatin 10 milliGRAM(s) Oral at bedtime  sodium chloride 0.9%. 1000 milliLiter(s) (60 mL/Hr) IV Continuous <Continuous>  sodium chloride 0.9%. 1000 milliLiter(s) (100 mL/Hr) IV Continuous <Continuous>    MEDICATIONS  (PRN):  acetaminophen     Tablet .. 650 milliGRAM(s) Oral every 6 hours PRN Temp greater or equal to 38C (100.4F), Mild Pain (1 - 3)  aluminum hydroxide/magnesium hydroxide/simethicone Suspension 30 milliLiter(s) Oral every 4 hours PRN Dyspepsia  dextrose Oral Gel 15 Gram(s) Oral once PRN Blood Glucose LESS THAN 70 milliGRAM(s)/deciliter  melatonin 3 milliGRAM(s) Oral at bedtime PRN Insomnia  ondansetron Injectable 4 milliGRAM(s) IV Push every 8 hours PRN Nausea and/or Vomiting  sodium chloride 0.65% Nasal 1 Spray(s) Both Nostrils every 2 hours PRN Nasal Congestion      
Patient is a 77y old  Male who presents with a chief complaint of hypoxia (2022 20:24)      INTERVAL HPI/OVERNIGHT EVENTS: seen and examined. Reports feeling slightly better. Still has productive cough     MEDICATIONS  (STANDING):  cefTRIAXone   IVPB 1000 milliGRAM(s) IV Intermittent every 24 hours  dextrose 5%. 1000 milliLiter(s) (100 mL/Hr) IV Continuous <Continuous>  dextrose 5%. 1000 milliLiter(s) (50 mL/Hr) IV Continuous <Continuous>  dextrose 50% Injectable 25 Gram(s) IV Push once  dextrose 50% Injectable 12.5 Gram(s) IV Push once  dextrose 50% Injectable 25 Gram(s) IV Push once  enoxaparin Injectable 40 milliGRAM(s) SubCutaneous every 24 hours  fluticasone propionate 50 MICROgram(s)/spray Nasal Spray 1 Spray(s) Both Nostrils two times a day  glucagon  Injectable 1 milliGRAM(s) IntraMuscular once  guaiFENesin ER 1200 milliGRAM(s) Oral every 12 hours  insulin lispro (ADMELOG) corrective regimen sliding scale   SubCutaneous three times a day before meals  losartan 100 milliGRAM(s) Oral daily  pantoprazole    Tablet 40 milliGRAM(s) Oral before breakfast  simvastatin 10 milliGRAM(s) Oral at bedtime  sodium chloride 0.9%. 1000 milliLiter(s) (60 mL/Hr) IV Continuous <Continuous>  sodium chloride 0.9%. 1000 milliLiter(s) (100 mL/Hr) IV Continuous <Continuous>    MEDICATIONS  (PRN):  acetaminophen     Tablet .. 650 milliGRAM(s) Oral every 6 hours PRN Temp greater or equal to 38C (100.4F), Mild Pain (1 - 3)  aluminum hydroxide/magnesium hydroxide/simethicone Suspension 30 milliLiter(s) Oral every 4 hours PRN Dyspepsia  dextrose Oral Gel 15 Gram(s) Oral once PRN Blood Glucose LESS THAN 70 milliGRAM(s)/deciliter  melatonin 3 milliGRAM(s) Oral at bedtime PRN Insomnia  ondansetron Injectable 4 milliGRAM(s) IV Push every 8 hours PRN Nausea and/or Vomiting  sodium chloride 0.65% Nasal 1 Spray(s) Both Nostrils every 2 hours PRN Nasal Congestion      Allergies    aspirin (Other)    Intolerances        REVIEW OF SYSTEMS:  CONSTITUTIONAL: No fever, weight loss, or fatigue  RESPIRATORY: No cough, wheezing, chills or hemoptysis; No shortness of breath  CARDIOVASCULAR: No chest pain, palpitations, dizziness, or leg swelling  GASTROINTESTINAL: No abdominal or epigastric pain. No nausea, vomiting, or hematemesis; No diarrhea or constipation. No melena or hematochezia.  NEUROLOGICAL: No headaches, memory loss, loss of strength, numbness, or tremors  MUSCULOSKELETAL: No joint pain or swelling; No muscle, back, or extremity pain      Vital Signs Last 24 Hrs  T(C): 36.9 (2022 11:10), Max: 37.5 (2022 21:24)  T(F): 98.5 (2022 11:10), Max: 99.5 (2022 21:24)  HR: 77 (2022 11:10) (74 - 91)  BP: 119/79 (2022 11:10) (112/65 - 137/80)  BP(mean): --  RR: 18 (2022 11:10) (16 - 18)  SpO2: 92% (2022 11:10) (91% - 95%)    Parameters below as of 2022 11:10  Patient On (Oxygen Delivery Method): room air        PHYSICAL EXAM:  GENERAL: ill looking male, laying in bed   HEAD:  Atraumatic, Normocephalic  EYES: EOMI, PERRLA, conjunctiva and sclera clear  NECK: Supple, No JVD, Normal thyroid  NERVOUS SYSTEM:  Alert & Oriented X3, No gross focal deficits  CHEST/LUNG: Clear to percussion bilaterally; No rales, rhonchi, wheezing, or rubs  HEART: Regular rate and rhythm; No murmurs, rubs, or gallops  ABDOMEN: Soft, Nontender, Nondistended; Bowel sounds present  EXTREMITIES:  No clubbing, cyanosis, or edema  SKIN: No rashes or lesions    LABS:                        12.5   10.59 )-----------( 227      ( 2022 04:44 )             38.8     07-18    134<L>  |  99  |  27.4<H>  ----------------------------<  112<H>  3.9   |  21.0<L>  |  1.56<H>    Ca    9.4      2022 04:44  Mg     2.1         TPro  7.6  /  Alb  3.8  /  TBili  0.7  /  DBili  x   /  AST  33  /  ALT  26  /  AlkPhos  94        Urinalysis Basic - ( 2022 18:13 )    Color: Yellow / Appearance: Clear / S.015 / pH: x  Gluc: x / Ketone: Trace  / Bili: Negative / Urobili: Negative mg/dL   Blood: x / Protein: 30 mg/dL / Nitrite: Negative   Leuk Esterase: Moderate / RBC: 3-5 /HPF / WBC 11-25 /HPF   Sq Epi: x / Non Sq Epi: Few / Bacteria: Moderate      CAPILLARY BLOOD GLUCOSE      POCT Blood Glucose.: 101 mg/dL (2022 13:42)  POCT Blood Glucose.: 119 mg/dL (2022 08:05)      RADIOLOGY & ADDITIONAL TESTS:    Imaging Personally Reviewed:  [ ] YES  [ ] NO    Consultant(s) Notes Reviewed:  [ ] YES  [ ] NO    Care Discussed with Consultants/Other Providers [ ] YES  [ ] NO    Plan of Care discussed with Housestaff [ ]YES [ ] NO

## 2022-07-19 NOTE — PROGRESS NOTE ADULT - ASSESSMENT
78yo M with PMHx of DM, HTN, lymphoma presented to ED c/o dry cough with associated generalized weakness.    Generalized weaknesses  -unable to ambulate due to acute viral illness and UTI   Afebrile, mild leukocytosis   -RVP +hMPV  -UA (+), UC pending   Started Ceftriaxone 1 Gr iv daily for UTI   Start Mucinex for cough   Flonase and Nasal saline for nasal congestion   IV fluids   TYlenol for pain and fever     Acute kidney failure secondary to dehydration   Serum Cr trending down   c/w IV fluids       HTN - BP controlled   -cont losartan     Type 2 DM   HBA1C 7.4, BG ocntrolled   -ISS, hypoglycemic protocol     DVT prophylaxis: enoxaparin       Plan of care discussed with pt in detail     
77 year old male with history of DM 2, HTN, HLD and Lymphoma presented with cough and generalized weakness.     1) Acute UTI  - Follow urine culture  - Continue Rocephin     2) hMPV Infection  - No respiratory distress  - Supportive care    3) Generalized Weakness  - Likely due to above (1&2)  - Improving     4) CKD 3  - Stable  - No FABRICIO  - Avoid nephrotoxic medications  - Monitor renal function     5) DM 2  - HbA1c 7.4  - Accu checks and ISS    6) HTN  - Continue Losartan    7) HLD  - Continue Simvastatin     DVT Prophylaxis -- Lovenox SQ    Dispo: Home in 24 hours.

## 2022-07-20 ENCOUNTER — TRANSCRIPTION ENCOUNTER (OUTPATIENT)
Age: 77
End: 2022-07-20

## 2022-07-20 VITALS
TEMPERATURE: 98 F | OXYGEN SATURATION: 96 % | HEART RATE: 85 BPM | SYSTOLIC BLOOD PRESSURE: 150 MMHG | DIASTOLIC BLOOD PRESSURE: 82 MMHG | RESPIRATION RATE: 18 BRPM

## 2022-07-20 LAB
-  AMPICILLIN/SULBACTAM: SIGNIFICANT CHANGE UP
-  CEFAZOLIN: SIGNIFICANT CHANGE UP
-  GENTAMICIN: SIGNIFICANT CHANGE UP
-  OXACILLIN: SIGNIFICANT CHANGE UP
-  PENICILLIN: SIGNIFICANT CHANGE UP
-  RIFAMPIN: SIGNIFICANT CHANGE UP
-  TETRACYCLINE: SIGNIFICANT CHANGE UP
-  TRIMETHOPRIM/SULFAMETHOXAZOLE: SIGNIFICANT CHANGE UP
-  VANCOMYCIN: SIGNIFICANT CHANGE UP
ANION GAP SERPL CALC-SCNC: 12 MMOL/L — SIGNIFICANT CHANGE UP (ref 5–17)
BASOPHILS # BLD AUTO: 0.03 K/UL — SIGNIFICANT CHANGE UP (ref 0–0.2)
BASOPHILS NFR BLD AUTO: 0.3 % — SIGNIFICANT CHANGE UP (ref 0–2)
BUN SERPL-MCNC: 30.4 MG/DL — HIGH (ref 8–20)
CALCIUM SERPL-MCNC: 9.7 MG/DL — SIGNIFICANT CHANGE UP (ref 8.6–10.2)
CHLORIDE SERPL-SCNC: 102 MMOL/L — SIGNIFICANT CHANGE UP (ref 98–107)
CO2 SERPL-SCNC: 22 MMOL/L — SIGNIFICANT CHANGE UP (ref 22–29)
CREAT SERPL-MCNC: 1.7 MG/DL — HIGH (ref 0.5–1.3)
CULTURE RESULTS: SIGNIFICANT CHANGE UP
EGFR: 40 ML/MIN/1.73M2 — LOW
EOSINOPHIL # BLD AUTO: 0.15 K/UL — SIGNIFICANT CHANGE UP (ref 0–0.5)
EOSINOPHIL NFR BLD AUTO: 1.7 % — SIGNIFICANT CHANGE UP (ref 0–6)
GLUCOSE BLDC GLUCOMTR-MCNC: 109 MG/DL — HIGH (ref 70–99)
GLUCOSE BLDC GLUCOMTR-MCNC: 140 MG/DL — HIGH (ref 70–99)
GLUCOSE BLDC GLUCOMTR-MCNC: 158 MG/DL — HIGH (ref 70–99)
GLUCOSE SERPL-MCNC: 117 MG/DL — HIGH (ref 70–99)
HCT VFR BLD CALC: 38.5 % — LOW (ref 39–50)
HGB BLD-MCNC: 12.5 G/DL — LOW (ref 13–17)
IMM GRANULOCYTES NFR BLD AUTO: 0.7 % — SIGNIFICANT CHANGE UP (ref 0–1.5)
LYMPHOCYTES # BLD AUTO: 1.66 K/UL — SIGNIFICANT CHANGE UP (ref 1–3.3)
LYMPHOCYTES # BLD AUTO: 19.2 % — SIGNIFICANT CHANGE UP (ref 13–44)
MAGNESIUM SERPL-MCNC: 2.2 MG/DL — SIGNIFICANT CHANGE UP (ref 1.6–2.6)
MCHC RBC-ENTMCNC: 28.9 PG — SIGNIFICANT CHANGE UP (ref 27–34)
MCHC RBC-ENTMCNC: 32.5 GM/DL — SIGNIFICANT CHANGE UP (ref 32–36)
MCV RBC AUTO: 88.9 FL — SIGNIFICANT CHANGE UP (ref 80–100)
METHOD TYPE: SIGNIFICANT CHANGE UP
MONOCYTES # BLD AUTO: 0.69 K/UL — SIGNIFICANT CHANGE UP (ref 0–0.9)
MONOCYTES NFR BLD AUTO: 8 % — SIGNIFICANT CHANGE UP (ref 2–14)
NEUTROPHILS # BLD AUTO: 6.05 K/UL — SIGNIFICANT CHANGE UP (ref 1.8–7.4)
NEUTROPHILS NFR BLD AUTO: 70.1 % — SIGNIFICANT CHANGE UP (ref 43–77)
ORGANISM # SPEC MICROSCOPIC CNT: SIGNIFICANT CHANGE UP
ORGANISM # SPEC MICROSCOPIC CNT: SIGNIFICANT CHANGE UP
PLATELET # BLD AUTO: 209 K/UL — SIGNIFICANT CHANGE UP (ref 150–400)
POTASSIUM SERPL-MCNC: 4.5 MMOL/L — SIGNIFICANT CHANGE UP (ref 3.5–5.3)
POTASSIUM SERPL-SCNC: 4.5 MMOL/L — SIGNIFICANT CHANGE UP (ref 3.5–5.3)
RBC # BLD: 4.33 M/UL — SIGNIFICANT CHANGE UP (ref 4.2–5.8)
RBC # FLD: 13.5 % — SIGNIFICANT CHANGE UP (ref 10.3–14.5)
SODIUM SERPL-SCNC: 136 MMOL/L — SIGNIFICANT CHANGE UP (ref 135–145)
SPECIMEN SOURCE: SIGNIFICANT CHANGE UP
WBC # BLD: 8.64 K/UL — SIGNIFICANT CHANGE UP (ref 3.8–10.5)
WBC # FLD AUTO: 8.64 K/UL — SIGNIFICANT CHANGE UP (ref 3.8–10.5)

## 2022-07-20 PROCEDURE — 80048 BASIC METABOLIC PNL TOTAL CA: CPT

## 2022-07-20 PROCEDURE — 96375 TX/PRO/DX INJ NEW DRUG ADDON: CPT

## 2022-07-20 PROCEDURE — 99239 HOSP IP/OBS DSCHRG MGMT >30: CPT

## 2022-07-20 PROCEDURE — 36415 COLL VENOUS BLD VENIPUNCTURE: CPT

## 2022-07-20 PROCEDURE — 83690 ASSAY OF LIPASE: CPT

## 2022-07-20 PROCEDURE — 83605 ASSAY OF LACTIC ACID: CPT

## 2022-07-20 PROCEDURE — 81001 URINALYSIS AUTO W/SCOPE: CPT

## 2022-07-20 PROCEDURE — 85025 COMPLETE CBC W/AUTO DIFF WBC: CPT

## 2022-07-20 PROCEDURE — 83735 ASSAY OF MAGNESIUM: CPT

## 2022-07-20 PROCEDURE — 83036 HEMOGLOBIN GLYCOSYLATED A1C: CPT

## 2022-07-20 PROCEDURE — 82962 GLUCOSE BLOOD TEST: CPT

## 2022-07-20 PROCEDURE — 93005 ELECTROCARDIOGRAM TRACING: CPT

## 2022-07-20 PROCEDURE — 96374 THER/PROPH/DIAG INJ IV PUSH: CPT

## 2022-07-20 PROCEDURE — 71045 X-RAY EXAM CHEST 1 VIEW: CPT

## 2022-07-20 PROCEDURE — 87186 SC STD MICRODIL/AGAR DIL: CPT

## 2022-07-20 PROCEDURE — 0225U NFCT DS DNA&RNA 21 SARSCOV2: CPT

## 2022-07-20 PROCEDURE — 94640 AIRWAY INHALATION TREATMENT: CPT

## 2022-07-20 PROCEDURE — 83880 ASSAY OF NATRIURETIC PEPTIDE: CPT

## 2022-07-20 PROCEDURE — 99285 EMERGENCY DEPT VISIT HI MDM: CPT | Mod: 25

## 2022-07-20 PROCEDURE — 87086 URINE CULTURE/COLONY COUNT: CPT

## 2022-07-20 PROCEDURE — 84484 ASSAY OF TROPONIN QUANT: CPT

## 2022-07-20 PROCEDURE — 80053 COMPREHEN METABOLIC PANEL: CPT

## 2022-07-20 RX ORDER — ACETAMINOPHEN 500 MG
2 TABLET ORAL
Qty: 0 | Refills: 0 | DISCHARGE
Start: 2022-07-20

## 2022-07-20 RX ADMIN — PANTOPRAZOLE SODIUM 40 MILLIGRAM(S): 20 TABLET, DELAYED RELEASE ORAL at 06:20

## 2022-07-20 RX ADMIN — Medication 1200 MILLIGRAM(S): at 06:20

## 2022-07-20 RX ADMIN — LOSARTAN POTASSIUM 100 MILLIGRAM(S): 100 TABLET, FILM COATED ORAL at 06:20

## 2022-07-20 RX ADMIN — Medication 1 SPRAY(S): at 06:20

## 2022-07-20 NOTE — DISCHARGE NOTE PROVIDER - CARE PROVIDER_API CALL
Goldberg, Steven M (MD)  Cardiovascular Disease; Internal Medicine  [address] [place] [address]  Phone: [phone]  Fax: [phone]  Follow Up Time: 1 week

## 2022-07-20 NOTE — PHYSICAL THERAPY INITIAL EVALUATION ADULT - ADDITIONAL COMMENTS
per patient he owns a SAC, has 1-2 steps to enter and a flight to the bedroom. Pt reports that at baseline he does not use an AD.

## 2022-07-20 NOTE — DISCHARGE NOTE NURSING/CASE MANAGEMENT/SOCIAL WORK - PATIENT PORTAL LINK FT
You can access the FollowMyHealth Patient Portal offered by [facility] by registering at the following website: [email or web address]. By joining [employer]’s FollowMyHealth portal, you will also be able to view your health information using other applications (apps) compatible with our system.

## 2022-07-20 NOTE — DISCHARGE NOTE PROVIDER - HOSPITAL COURSE
77 year old male with history of DM 2, HTN, HLD and Lymphoma presented with cough and generalized weakness. Found to have hMPV infection. He was treated supportively. No respiratory distress. Also found to have UTI, started on Rocephin. Urine culture grew Staph Aureus. His weakness has improved and is back to baseline. He is stable for discharge.

## 2022-07-20 NOTE — DISCHARGE NOTE PROVIDER - NSDCMRMEDTOKEN_GEN_ALL_CORE_FT
acetaminophen 325 mg oral tablet: 2 tab(s) orally every 6 hours, As needed, Temp greater or equal to 38C (100.4F), Mild Pain (1 - 3)  Farxiga 10 mg oral tablet: 1 tab(s) orally once a day  guaiFENesin 1200 mg oral tablet, extended release: 1 tab(s) orally every 12 hours, As Needed for cough.   Januvia 50 mg oral tablet: 1 tab(s) orally once a day  losartan 100 mg oral tablet: 1 tab(s) orally once a day  metFORMIN 500 mg oral tablet: 1 tab(s) orally once a day  omeprazole 20 mg oral delayed release capsule: 1 cap(s) orally once a day  simvastatin 10 mg oral tablet: 1 tab(s) orally once a day (at bedtime)

## 2022-07-20 NOTE — DISCHARGE NOTE PROVIDER - ATTENDING DISCHARGE PHYSICAL EXAMINATION:
Vital Signs   T(C): 36.7 (20 Jul 2022 11:33), Max: 37.7 (19 Jul 2022 15:19)  T(F): 98.1 (20 Jul 2022 11:33), Max: 99.8 (19 Jul 2022 15:19)  HR: 76 (20 Jul 2022 11:33) (71 - 79)  BP: 134/69 (20 Jul 2022 11:33) (103/69 - 150/75)  RR: 18 (20 Jul 2022 11:33) (17 - 18)  SpO2: 95% (20 Jul 2022 11:33) (93% - 97%)  General: Elderly male sitting in chair comfortably. No acute distress  HEENT: EOMI. Clear conjunctivae. Moist mucus membrane  Neck: Supple.   Chest: Good air entry. No wheezing, rales or rhonchi. No chest wall tenderness.  Heart: Normal S1 & S2. RRR.   Abdomen: Soft. Non-tender. Non-distended. + BS  Ext: No pedal edema. No calf tenderness   Neuro: AAO x 3. No focal deficit. No speech disorder  Skin: Warm and Dry  Psychiatry: Normal mood and affect

## 2022-07-20 NOTE — DISCHARGE NOTE PROVIDER - NSDCCPCAREPLAN_GEN_ALL_CORE_FT
PRINCIPAL DISCHARGE DIAGNOSIS  Diagnosis: Infection due to human metapneumovirus (hMPV)  Assessment and Plan of Treatment: Supportive care.   Follow up with PMD in 1 week.      SECONDARY DISCHARGE DIAGNOSES  Diagnosis: Acute UTI  Assessment and Plan of Treatment: s/p Rocephin.  Follow up with PMD in 1 week.

## 2022-08-19 PROBLEM — I10 ESSENTIAL (PRIMARY) HYPERTENSION: Chronic | Status: ACTIVE | Noted: 2022-07-17

## 2022-08-19 PROBLEM — E11.9 TYPE 2 DIABETES MELLITUS WITHOUT COMPLICATIONS: Chronic | Status: ACTIVE | Noted: 2022-07-17

## 2022-09-28 ENCOUNTER — APPOINTMENT (OUTPATIENT)
Dept: ORTHOPEDIC SURGERY | Facility: CLINIC | Age: 77
End: 2022-09-28

## 2022-09-28 PROCEDURE — 99213 OFFICE O/P EST LOW 20 MIN: CPT | Mod: 25

## 2022-09-28 PROCEDURE — 20610 DRAIN/INJ JOINT/BURSA W/O US: CPT | Mod: LT

## 2022-09-28 PROCEDURE — 73502 X-RAY EXAM HIP UNI 2-3 VIEWS: CPT | Mod: LT

## 2022-09-28 NOTE — PHYSICAL EXAM
[LE] : Sensory: Intact in bilateral lower extremities [de-identified] : Constitutional\par o Appearance : well-nourished, well developed, alert, in no acute distress \par Head and Face\par o Head :\par ¦ Inspection : atraumatic, normocephalic\par o Face :\par ¦ Inspection : no visible rash or discoloration\par Respiratory\par o Respiratory Effort: breathing unlabored \par Neurologic\par o Mental Status Examination :\par ¦ Orientation : alert and oriented X 3\par Psychiatric\par o Mood and Affect: mood normal, affect appropriate\par Cardiovascular\par o Observation/Palpation : - no swelling\par Lymphatic\par o Additional Nodes : No palpable lymph nodes present\par \par Lumbosacral Spine\par o Inspection : well-healed shingles lesions- right side, no obvious kyphosis \par o Palpation : paraspinal musculature nontender, no SI joint / sciatic notch tenderness \par o Range of Motion : extension, sidebending and rotation cause no discomfort \par o Muscle Strength : paraspinal muscle strength and tone within normal limits\par o Muscle Tone : paraspinal muscle strength and tone within normal limits\par o Tests: straight leg test negative, moshe tests negative \par  \par Right Lower Extremity\par o Buttock : no tenderness, no swelling or deformities, well-healed shingles lesions\par o Right Hip :\par ¦ Inspection/Palpation : no trochanteric or ITB tenderness, no swelling or deformities\par ¦ Range of Motion : full flexion and rotation, no crepitance\par ¦ Stability : joint stability intact\par ¦ Strength : extension, flexion 5/5, adduction, abduction 5/5, internal rotation and external rotation\par \par Left Lower Extremity\par o Buttock : no tenderness, swelling or deformities \par o Left Hip :\par ¦ Inspection/Palpation : no greater trochanteric tenderness, no IT band tenderness, no swelling or deformities\par ¦ Range of Motion : full flexion, slight limitation of internal and external rotation with discomfort at the extremes of motion, no crepitance\par ¦ Stability : joint stability intact\par ¦ Strength : extension, flexion 5/5, adduction, abduction 5/5, internal rotation and external rotation\par  \par Gait: normal gait, no obvious kyphosis, ambulating without a cane, no significant extremity swelling or lymphedema, limited core strength, tight hamstrings bilaterally, equal leg lengths, normal sensation to light touch\par \par Radiology Results\par o Pelvis and Left Hip : AP pelvis and lateral LEFT hip were obtained and revealed 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 No calcifications.\par \par o Left Hip injection: Anatomic location- left greater trochanteric bursa, Spray - area was sterilized with Betadine and alcohol and anesthetized with Ethyl Chloride , needle used-20G, Medications given- 1cc's lidocaine, 0.5cc's kenalog, 0.5 cc's dexamethasone, and patient tolerated it well.

## 2022-09-28 NOTE — ADDENDUM
[FreeTextEntry1] : I, Jonas Patricia, acted solely as a scribe for Dr. Martin Givens on this date 09/28/2022.\par \par All medical record entries made by the Scribe were at my, Dr. Martin Givens, direction and personally dictated by me on 09/28/2022. I have reviewed the chart and agree that the record accurately reflects my personal performance of the history, physical exam, assessment and plan. I have also personally directed, reviewed, and agreed with the chart.

## 2022-09-28 NOTE — DISCUSSION/SUMMARY
[de-identified] : I discussed the underlying pathophysiology of the patient's condition in great detail with them. I went over the patient's x-rays with them in great detail. A cortisone injection was given in the left hip greater trochanteric bursa today. I instructed the patient on ROM exercises and told them to take it easy. The use of ice and rest was reviewed with the patient. The patient may resume activities tomorrow. He will watch his sugars for the next couple of days. I encouraged him to continue the exercises. All of their questions were answered. They understand and consent to the plan. \par \par FU in 1 month.\par after a cortisone injection in the left hip greater trochanteric bursa today (09/28/2022).

## 2022-11-07 ENCOUNTER — APPOINTMENT (OUTPATIENT)
Dept: ORTHOPEDIC SURGERY | Facility: CLINIC | Age: 77
End: 2022-11-07
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
## 2023-04-14 ENCOUNTER — OFFICE (OUTPATIENT)
Dept: URBAN - METROPOLITAN AREA CLINIC 90 | Facility: CLINIC | Age: 78
Setting detail: OPHTHALMOLOGY
End: 2023-04-14
Payer: COMMERCIAL

## 2023-04-14 DIAGNOSIS — H02.032: ICD-10-CM

## 2023-04-14 DIAGNOSIS — E11.9: ICD-10-CM

## 2023-04-14 DIAGNOSIS — H25.11: ICD-10-CM

## 2023-04-14 DIAGNOSIS — H35.033: ICD-10-CM

## 2023-04-14 DIAGNOSIS — H02.035: ICD-10-CM

## 2023-04-14 DIAGNOSIS — H16.222: ICD-10-CM

## 2023-04-14 DIAGNOSIS — H35.373: ICD-10-CM

## 2023-04-14 PROCEDURE — 92014 COMPRE OPH EXAM EST PT 1/>: CPT | Performed by: OPHTHALMOLOGY

## 2023-04-14 PROCEDURE — 92134 CPTRZ OPH DX IMG PST SGM RTA: CPT | Performed by: OPHTHALMOLOGY

## 2023-04-14 ASSESSMENT — AXIALLENGTH_DERIVED
OD_AL: 23.517
OD_AL: 23.517
OS_AL: 23.8053
OS_AL: 23.6086
OS_AL: 23.7559
OS_AL: 23.6086
OD_AL: 23.4687
OD_AL: 23.517
OD_AL: 23.5655
OD_AL: 23.517
OS_AL: 23.6086
OS_AL: 23.6086

## 2023-04-14 ASSESSMENT — REFRACTION_CURRENTRX
OS_OVR_VA: 20/
OD_VPRISM_DIRECTION: PROGS
OD_AXIS: 094
OD_CYLINDER: -0.50
OD_VPRISM_DIRECTION: SV
OS_CYLINDER: -0.50
OD_CYLINDER: -0.50
OS_OVR_VA: 20/
OD_AXIS: 091
OS_VPRISM_DIRECTION: SV
OS_SPHERE: -0.50
OS_VPRISM_DIRECTION: PROGS
OS_AXIS: 038
OD_OVR_VA: 20/
OD_VPRISM_DIRECTION: SV
OS_AXIS: 002
OS_SPHERE: -1.00
OS_CYLINDER: -0.25
OD_CYLINDER: -0.50
OD_VPRISM_DIRECTION: SV
OS_SPHERE: -1.00
OD_AXIS: 090
OD_SPHERE: +2.00
OS_CYLINDER: -0.25
OS_OVR_VA: 20/
OS_AXIS: 172
OD_CYLINDER: -0.50
OS_ADD: +2.50
OD_OVR_VA: 20/
OS_CYLINDER: -0.25
OS_SPHERE: +2.00
OD_OVR_VA: 20/
OS_VPRISM_DIRECTION: SV
OD_AXIS: 175
OD_SPHERE: -0.50
OD_SPHERE: PLANO
OS_AXIS: 179
OD_SPHERE: -0.50
OS_VPRISM_DIRECTION: SV
OD_ADD: +2.50

## 2023-04-14 ASSESSMENT — REFRACTION_MANIFEST
OS_VA2: 20/25(J1)
OD_CYLINDER: -0.50
OS_VA2: 20/20
OD_VA1: 20/30
OS_SPHERE: -0.50
OS_VA1: 20/20
OD_CYLINDER: -0.50
OS_SPHERE: -0.50
OD_ADD: +2.50
OD_SPHERE: -0.50
OD_SPHERE: -0.25
OD_CYLINDER: -0.50
OS_ADD: +2.50
OS_AXIS: 175
OD_ADD: +2.50
OD_AXIS: 172
OD_SPHERE: -0.50
OD_SPHERE: -0.50
OS_CYLINDER: -0.50
OD_VA2: 20/20
OS_VA2: 20/25(J1)
OD_CYLINDER: -0.50
OD_AXIS: 090
OD_AXIS: 090
OS_CYLINDER: SPH
OD_AXIS: 090
OS_VA1: 20/25
OS_CYLINDER: -0.50
OS_SPHERE: -1.00
OD_VA1: 20/25
OD_ADD: +2.50
OD_AXIS: 110
OS_AXIS: 040
OS_VA1: 20/20
OD_ADD: +2.50
OD_AXIS: 172
OS_ADD: +2.50
OS_AXIS: 038
OS_CYLINDER: -0.50
OD_SPHERE: PLANO
OS_CYLINDER: -0.50
OS_ADD: +2.50
OD_VA1: 20/25
OS_ADD: +2.50
OS_SPHERE: -0.50
OS_VA1: 20/25
OS_AXIS: 010
OD_VA2: 20/20
OD_ADD: +2.50
OD_SPHERE: -0.50
OS_AXIS: 175
OD_VA2: 20/25(J1)
OD_VA2: 20/25(J1)
OD_CYLINDER: -0.75
OS_SPHERE: -1.00
OS_SPHERE: -1.00
OD_VA1: 20/25-
OS_CYLINDER: -0.25
OD_CYLINDER: -0.50
OS_VA2: 20/20
OS_ADD: +2.50

## 2023-04-14 ASSESSMENT — SPHEQUIV_DERIVED
OD_SPHEQUIV: -0.75
OD_SPHEQUIV: -0.875
OS_SPHEQUIV: -0.75
OD_SPHEQUIV: -0.75
OD_SPHEQUIV: -0.75
OD_SPHEQUIV: -0.625
OS_SPHEQUIV: -1.25
OD_SPHEQUIV: -0.75
OS_SPHEQUIV: -0.75
OS_SPHEQUIV: -0.75
OS_SPHEQUIV: -1.125
OS_SPHEQUIV: -0.75

## 2023-04-14 ASSESSMENT — KERATOMETRY
OS_K1POWER_DIOPTERS: 44.00
OD_K2POWER_DIOPTERS: 44.75
OS_K2POWER_DIOPTERS: 44.50
OD_K1POWER_DIOPTERS: 44.25
OD_AXISANGLE_DEGREES: 083
OS_AXISANGLE_DEGREES: 086
METHOD_AUTO_MANUAL: AUTO

## 2023-04-14 ASSESSMENT — CONFRONTATIONAL VISUAL FIELD TEST (CVF)
OS_FINDINGS: FULL
OD_FINDINGS: FULL

## 2023-04-14 ASSESSMENT — LID POSITION - COMMENTS: OD_COMMENTS: LAXITY

## 2023-04-14 ASSESSMENT — REFRACTION_AUTOREFRACTION
OS_AXIS: 002
OS_CYLINDER: -0.50
OS_SPHERE: -0.50
OD_AXIS: 105
OD_SPHERE: -0.75
OD_CYLINDER: -0.25

## 2023-04-14 ASSESSMENT — LID POSITION - ENTROPION
OD_ENTROPION: RLL
OS_ENTROPION: LLL

## 2023-04-14 ASSESSMENT — TONOMETRY
OS_IOP_MMHG: 11
OD_IOP_MMHG: 12

## 2023-04-14 ASSESSMENT — VISUAL ACUITY
OS_BCVA: 20/50
OD_BCVA: 20/20

## 2023-04-27 ENCOUNTER — APPOINTMENT (OUTPATIENT)
Dept: ORTHOPEDIC SURGERY | Facility: CLINIC | Age: 78
End: 2023-04-27
Payer: COMMERCIAL

## 2023-04-27 DIAGNOSIS — M16.12 UNILATERAL PRIMARY OSTEOARTHRITIS, LEFT HIP: ICD-10-CM

## 2023-04-27 DIAGNOSIS — M70.62 TROCHANTERIC BURSITIS, LEFT HIP: ICD-10-CM

## 2023-04-27 PROCEDURE — 99214 OFFICE O/P EST MOD 30 MIN: CPT

## 2023-06-01 ENCOUNTER — APPOINTMENT (OUTPATIENT)
Dept: ORTHOPEDIC SURGERY | Facility: CLINIC | Age: 78
End: 2023-06-01

## 2023-12-07 ENCOUNTER — OFFICE (OUTPATIENT)
Dept: URBAN - METROPOLITAN AREA CLINIC 90 | Facility: CLINIC | Age: 78
Setting detail: OPHTHALMOLOGY
End: 2023-12-07
Payer: COMMERCIAL

## 2023-12-07 DIAGNOSIS — H16.222: ICD-10-CM

## 2023-12-07 DIAGNOSIS — H35.373: ICD-10-CM

## 2023-12-07 DIAGNOSIS — H02.035: ICD-10-CM

## 2023-12-07 DIAGNOSIS — H02.032: ICD-10-CM

## 2023-12-07 DIAGNOSIS — H52.4: ICD-10-CM

## 2023-12-07 DIAGNOSIS — H35.033: ICD-10-CM

## 2023-12-07 DIAGNOSIS — H52.7: ICD-10-CM

## 2023-12-07 DIAGNOSIS — H25.11: ICD-10-CM

## 2023-12-07 DIAGNOSIS — E11.9: ICD-10-CM

## 2023-12-07 PROCEDURE — 92015 DETERMINE REFRACTIVE STATE: CPT | Performed by: OPHTHALMOLOGY

## 2023-12-07 PROCEDURE — 92250 FUNDUS PHOTOGRAPHY W/I&R: CPT | Performed by: OPHTHALMOLOGY

## 2023-12-07 PROCEDURE — 92014 COMPRE OPH EXAM EST PT 1/>: CPT | Performed by: OPHTHALMOLOGY

## 2023-12-07 ASSESSMENT — REFRACTION_AUTOREFRACTION
OD_CYLINDER: -0.75
OS_CYLINDER: 0.00
OD_SPHERE: -1.00
OS_SPHERE: -0.50
OD_AXIS: 087
OS_AXIS: 000

## 2023-12-07 ASSESSMENT — REFRACTION_MANIFEST
OS_AXIS: 038
OD_SPHERE: -1.75
OD_VA1: 20/25-
OD_CYLINDER: -0.50
OD_VA2: 20/20
OS_VA2: 20/25(J1)
OD_CYLINDER: -0.50
OD_AXIS: 110
OD_CYLINDER: -0.50
OS_CYLINDER: SPHERE
OD_SPHERE: -0.25
OS_VA2: 20/25(J1)
OD_AXIS: 090
OD_SPHERE: PLANO
OD_VA1: 20/30
OS_ADD: +2.50
OD_ADD: +2.50
OS_SPHERE: -1.00
OS_AXIS: 175
OD_SPHERE: -1.50
OS_VA1: 20/25
OD_SPHERE: -0.50
OD_VA1: 20/30
OS_VA1: 20/20
OD_ADD: +2.50
OD_SPHERE: -0.50
OS_VA1: 20/20
OS_CYLINDER: SPHERE
OD_ADD: +2.50
OS_ADD: +2.75
OD_AXIS: 090
OS_ADD: +2.50
OS_VA2: 20/20
OS_VA2: 20/25(J1)
OD_VA2: 20/25(J1)
OD_CYLINDER: -0.50
OD_SPHERE: -0.50
OD_VA2: 20/25(J1)
OS_AXIS: 040
OS_CYLINDER: -0.50
OS_SPHERE: -1.00
OS_VA2: 20/20
OS_ADD: +2.50
OD_VA2: 20/20
OD_CYLINDER: -0.50
OD_AXIS: 090
OS_SPHERE: -0.50
OD_ADD: +2.50
OD_ADD: +2.50
OD_ADD: +2.75
OS_SPHERE: -1.00
OS_VA1: 20/20
OD_CYLINDER: -0.50
OS_CYLINDER: SPH
OD_VA1: 20/25
OS_SPHERE: -1.00
OS_CYLINDER: -0.50
OS_ADD: +2.50
OS_AXIS: 010
OS_VA1: 20/20
OS_SPHERE: -0.50
OS_CYLINDER: -0.50
OD_AXIS: 085
OD_AXIS: 085
OD_CYLINDER: -0.75
OS_VA2: 20/25(J1)
OD_SPHERE: -0.50
OD_CYLINDER: -0.50
OS_ADD: +2.50
OS_AXIS: 175
OS_CYLINDER: -0.25
OS_ADD: +2.50
OD_VA2: 20/25(J1)
OD_VA2: 20/25(J1)
OS_CYLINDER: -0.50
OD_ADD: +2.50
OD_VA1: 20/25
OD_VA1: 20/30
OD_AXIS: 172
OS_VA1: 20/25
OS_SPHERE: -0.50
OD_AXIS: 172
OS_SPHERE: -1.00

## 2023-12-07 ASSESSMENT — SPHEQUIV_DERIVED
OS_SPHEQUIV: -0.5
OD_SPHEQUIV: -0.75
OS_SPHEQUIV: -0.75
OD_SPHEQUIV: -0.75
OD_SPHEQUIV: -0.75
OS_SPHEQUIV: -1.125
OD_SPHEQUIV: -2
OD_SPHEQUIV: -1.375
OD_SPHEQUIV: -0.625
OD_SPHEQUIV: -1.75
OS_SPHEQUIV: -0.75
OS_SPHEQUIV: -1.25
OS_SPHEQUIV: -0.75
OD_SPHEQUIV: -0.75

## 2023-12-07 ASSESSMENT — LID POSITION - COMMENTS: OD_COMMENTS: LAXITY

## 2023-12-07 ASSESSMENT — REFRACTION_CURRENTRX
OD_AXIS: 091
OS_SPHERE: -0.50
OD_SPHERE: -0.50
OS_AXIS: 179
OD_OVR_VA: 20/
OD_VPRISM_DIRECTION: SV
OS_VPRISM_DIRECTION: SV
OS_AXIS: 038
OD_AXIS: 175
OD_SPHERE: +2.00
OD_AXIS: 090
OS_SPHERE: +2.00
OS_CYLINDER: -0.25
OD_VPRISM_DIRECTION: PROGS
OS_SPHERE: -1.00
OD_CYLINDER: -0.50
OS_CYLINDER: -0.50
OD_AXIS: 094
OD_SPHERE: PLANO
OS_VPRISM_DIRECTION: SV
OD_VPRISM_DIRECTION: SV
OS_VPRISM_DIRECTION: PROGS
OS_OVR_VA: 20/
OS_SPHERE: -1.00
OS_CYLINDER: -0.25
OD_OVR_VA: 20/
OD_VPRISM_DIRECTION: SV
OS_AXIS: 172
OS_AXIS: 002
OD_OVR_VA: 20/
OD_CYLINDER: -0.50
OS_CYLINDER: -0.25
OS_ADD: +2.50
OD_CYLINDER: -0.50
OD_CYLINDER: -0.50
OS_VPRISM_DIRECTION: SV
OD_ADD: +2.50
OS_OVR_VA: 20/
OS_OVR_VA: 20/
OD_SPHERE: -0.50

## 2023-12-07 ASSESSMENT — CONFRONTATIONAL VISUAL FIELD TEST (CVF)
OD_FINDINGS: FULL
OS_FINDINGS: FULL

## 2023-12-07 ASSESSMENT — LID POSITION - ENTROPION
OD_ENTROPION: RLL
OS_ENTROPION: LLL

## 2023-12-19 PROBLEM — H35.033 HYPERTENSIVE RETINOPATHY; BOTH EYES: Status: ACTIVE | Noted: 2023-12-19

## 2024-03-31 ENCOUNTER — APPOINTMENT (OUTPATIENT)
Dept: CT IMAGING | Facility: IMAGING CENTER | Age: 79
End: 2024-03-31

## 2024-05-16 ENCOUNTER — OUTPATIENT (OUTPATIENT)
Dept: INPATIENT UNIT | Facility: HOSPITAL | Age: 79
LOS: 1 days | End: 2024-05-16
Payer: MEDICARE

## 2024-05-16 ENCOUNTER — TRANSCRIPTION ENCOUNTER (OUTPATIENT)
Age: 79
End: 2024-05-16

## 2024-05-16 VITALS
SYSTOLIC BLOOD PRESSURE: 144 MMHG | TEMPERATURE: 98 F | DIASTOLIC BLOOD PRESSURE: 63 MMHG | RESPIRATION RATE: 17 BRPM | HEART RATE: 67 BPM | OXYGEN SATURATION: 99 %

## 2024-05-16 VITALS
DIASTOLIC BLOOD PRESSURE: 66 MMHG | SYSTOLIC BLOOD PRESSURE: 150 MMHG | TEMPERATURE: 98 F | RESPIRATION RATE: 16 BRPM | HEART RATE: 40 BPM | OXYGEN SATURATION: 100 % | WEIGHT: 147.05 LBS | HEIGHT: 67 IN

## 2024-05-16 DIAGNOSIS — Z98.890 OTHER SPECIFIED POSTPROCEDURAL STATES: Chronic | ICD-10-CM

## 2024-05-16 DIAGNOSIS — I49.5 SICK SINUS SYNDROME: ICD-10-CM

## 2024-05-16 DIAGNOSIS — Z98.89 OTHER SPECIFIED POSTPROCEDURAL STATES: Chronic | ICD-10-CM

## 2024-05-16 LAB
B-OH-BUTYR SERPL-SCNC: 0.2 MMOL/L — SIGNIFICANT CHANGE UP
BLD GP AB SCN SERPL QL: NEGATIVE — SIGNIFICANT CHANGE UP
GLUCOSE BLDC GLUCOMTR-MCNC: 118 MG/DL — HIGH (ref 70–99)
GLUCOSE BLDC GLUCOMTR-MCNC: 138 MG/DL — HIGH (ref 70–99)
RH IG SCN BLD-IMP: POSITIVE — SIGNIFICANT CHANGE UP
RH IG SCN BLD-IMP: POSITIVE — SIGNIFICANT CHANGE UP

## 2024-05-16 PROCEDURE — 93005 ELECTROCARDIOGRAM TRACING: CPT

## 2024-05-16 PROCEDURE — C1898: CPT

## 2024-05-16 PROCEDURE — C1785: CPT

## 2024-05-16 PROCEDURE — 33208 INSRT HEART PM ATRIAL & VENT: CPT

## 2024-05-16 PROCEDURE — 82010 KETONE BODYS QUAN: CPT

## 2024-05-16 PROCEDURE — 86850 RBC ANTIBODY SCREEN: CPT

## 2024-05-16 PROCEDURE — C1887: CPT

## 2024-05-16 PROCEDURE — C1892: CPT

## 2024-05-16 PROCEDURE — C1894: CPT

## 2024-05-16 PROCEDURE — 82962 GLUCOSE BLOOD TEST: CPT

## 2024-05-16 PROCEDURE — 86901 BLOOD TYPING SEROLOGIC RH(D): CPT

## 2024-05-16 PROCEDURE — 71046 X-RAY EXAM CHEST 2 VIEWS: CPT

## 2024-05-16 PROCEDURE — 36415 COLL VENOUS BLD VENIPUNCTURE: CPT

## 2024-05-16 PROCEDURE — C1769: CPT

## 2024-05-16 PROCEDURE — 71045 X-RAY EXAM CHEST 1 VIEW: CPT

## 2024-05-16 PROCEDURE — 86900 BLOOD TYPING SEROLOGIC ABO: CPT

## 2024-05-16 PROCEDURE — C1889: CPT

## 2024-05-16 RX ORDER — CEFAZOLIN SODIUM 1 G
2000 VIAL (EA) INJECTION ONCE
Refills: 0 | Status: DISCONTINUED | OUTPATIENT
Start: 2024-05-16 | End: 2024-05-16

## 2024-05-16 RX ORDER — OMEPRAZOLE 10 MG/1
1 CAPSULE, DELAYED RELEASE ORAL
Qty: 0 | Refills: 0 | DISCHARGE

## 2024-05-16 RX ORDER — ACETAMINOPHEN 500 MG
650 TABLET ORAL EVERY 6 HOURS
Refills: 0 | Status: DISCONTINUED | OUTPATIENT
Start: 2024-05-16 | End: 2024-05-16

## 2024-05-16 RX ORDER — CEPHALEXIN 500 MG
250 CAPSULE ORAL EVERY 6 HOURS
Refills: 0 | Status: DISCONTINUED | OUTPATIENT
Start: 2024-05-16 | End: 2024-05-16

## 2024-05-16 RX ORDER — SIMVASTATIN 20 MG/1
1 TABLET, FILM COATED ORAL
Qty: 0 | Refills: 0 | DISCHARGE

## 2024-05-16 RX ORDER — CEPHALEXIN 500 MG
1 CAPSULE ORAL
Qty: 8 | Refills: 0
Start: 2024-05-16 | End: 2024-05-17

## 2024-05-16 RX ORDER — SITAGLIPTIN 50 MG/1
1 TABLET, FILM COATED ORAL
Qty: 0 | Refills: 0 | DISCHARGE

## 2024-05-16 RX ORDER — METFORMIN HYDROCHLORIDE 850 MG/1
1 TABLET ORAL
Qty: 0 | Refills: 0 | DISCHARGE

## 2024-05-16 RX ORDER — LOSARTAN POTASSIUM 100 MG/1
1 TABLET, FILM COATED ORAL
Qty: 0 | Refills: 0 | DISCHARGE

## 2024-05-16 RX ORDER — DAPAGLIFLOZIN 10 MG/1
1 TABLET, FILM COATED ORAL
Qty: 0 | Refills: 0 | DISCHARGE

## 2024-05-16 RX ORDER — CHLORHEXIDINE GLUCONATE 213 G/1000ML
1 SOLUTION TOPICAL ONCE
Refills: 0 | Status: DISCONTINUED | OUTPATIENT
Start: 2024-05-16 | End: 2024-05-16

## 2024-05-16 RX ORDER — LOSARTAN POTASSIUM 100 MG/1
100 TABLET, FILM COATED ORAL DAILY
Refills: 0 | Status: DISCONTINUED | OUTPATIENT
Start: 2024-05-16 | End: 2024-05-16

## 2024-05-16 RX ADMIN — Medication 250 MILLIGRAM(S): at 18:48

## 2024-05-16 NOTE — ASU DISCHARGE PLAN (ADULT/PEDIATRIC) - CARE PROVIDER_API CALL
Nicolás Saldivar  Cardiac Electrophysiology  [address], [address]  [place] [address]  Phone: [phone]  Follow Up Time: 1 month

## 2024-05-16 NOTE — CHART NOTE - NSCHARTNOTEFT_GEN_A_CORE
Dx. Bradycardia and 9 Sec pauses  S/p Dual chamber PPM DDDR   in left CW  Site benign, VSS      Continue to monitor  Keflex 250mg Q 6 hours for 2 days  Tylenol 650mg Q 6 H prn   Percocet 1 tab Q 6-8 hours prn for 2 days    CXR (p) stat, PA/Lat when pt is able to ambulate    BR for 2 hours,   may D/C home after 7:30 after CXR read Dx. Bradycardia and 9 Sec pauses  S/p Dual chamber PPM DDDR   in left CW  Site benign, VSS      Continue to monitor  Keflex 250mg Q 6 hours for 2 days  Tylenol 650mg Q 6 H prn     CXR (p) stat, PA/Lat when pt is able to ambulate    BR for 2 hours,   may D/C home after 7:30 after CXR read

## 2024-05-16 NOTE — ASU DISCHARGE PLAN (ADULT/PEDIATRIC) - ASU DC SPECIAL INSTRUCTIONSFT
Your incision is closed with either surgical tape, staples or a surgical glue  - DO NOT scrub, rub, or pick at the site    AFTER 3 days, you may shower   - Use mild sop and gentle water stream, then pat dry with a towel   - DO NOT apply lotions, powders, ointments, or perfumes to the incision    DO NOT soak your incisional site in water fo 4-6 weeks (no baths, swimming, hot tub...)  Wear loose clothing around site for 1-2 weeks  IF surgical tape was used DO NOT remove the strips, they will fall off on their own   IF surgical glue was used, it will naturally fall off within 3 weeks  if staples were used, they will be removed in 7-10 days by your doctor    ACTIVITY:  for 2 weeks AFTER  your procedure  - DO NOT RAISE your arm above shoulder level (on the same side of your incision)  for 4 weeks AFTER your procedure   - DO NOT LIFT anything 10 lbs or heavier (on the side of your implant)   - certain activities may be limited longer, those that involve swinging your arm, and will be discussed with your EP doctor  DO NOT DRIVE until your EP Doctor or nurse practitioner/ physician assistant states it is safe to do so  A follow up appointment in 7-14 days will be arranged before your discharge    ID CARD:   you will receive an ID CARD and device company booklet   - please carry that card with you at all times    DIET   Follow the heart healthy diet recommended by you doctor   If you smoke, we recommend you quit. It will immediately improve your health     - If you would like to let us help you live smoke-free, call the [facility] at [phone]    ***CALL YOUR DOCTOR ***  IF you have fever, chills, body aches, or severe pain, swelling, redness, heat, yellow drainage from your incision site  IF bleeding  or significant new swelling from your puncture site  IF your experience lightheadedness, dizziness, or fainting spell  IF unable to get in contact with your doctor, you may call the Cardiology Office at [facility] at [phone]

## 2024-05-16 NOTE — PATIENT PROFILE ADULT - FALL HARM RISK - HARM RISK INTERVENTIONS

## 2024-05-16 NOTE — PRE-ANESTHESIA EVALUATION ADULT - NSANTHPEFT_GEN_ALL_CORE
Gen: WNWD, NAD   Neuro: AAOx4, moves all four extremities spontaneously  Resp: Unlabored breathing on room air, speaks in full sentences   CV: Bradycardic   Ext: WWP

## 2024-05-16 NOTE — PRE-ANESTHESIA EVALUATION ADULT - NSANTHOSAYNRD_GEN_A_CORE
No. YOAV screening performed.  STOP BANG Legend: 0-2 = LOW Risk; 3-4 = INTERMEDIATE Risk; 5-8 = HIGH Risk

## 2024-05-16 NOTE — H&P CARDIOLOGY - HISTORY OF PRESENT ILLNESS
80 y/o male with PMHx of Basal cell carcinoma of the skin, T2DM, HTN, HLD, CKD III and symptomatic bradycardia presents today for PPM implant.  78 y/o male with PMHx of Waldenstrom's macroglobulinemia, Basal cell carcinoma of the skin, T2DM, HTN, HLD, CKD III and symptomatic bradycardia presents today for PPM implant.  Patient endorsing fatigue and several episodes of lightheadedness occurring throughout the day mostly while sitting, he denies syncope.  He was evaluated by his Cardiologist, Dr. Goldberg and worke event monitory for one week demonstrating 9 seconds pauses, occurring during sleep.  He was subsequently referred to EP for further management.

## 2024-05-16 NOTE — PRE-ANESTHESIA EVALUATION ADULT - NSANTHPMHFT_GEN_ALL_CORE
79M with DMII on Farxiga (last taken Sunday, ketones negative this AM), HTN, HLD, CKDIII, who had a sinus pause for 9 seconds while sleeping and with HR in 30s at baseline with some dizziness, presenting for PPM placement.

## 2024-05-16 NOTE — H&P CARDIOLOGY - NSICDXPASTMEDICALHX_GEN_ALL_CORE_FT
PAST MEDICAL HISTORY:  CAD (coronary artery disease)     DM (diabetes mellitus)     HLD (hyperlipidemia)     HLD (hyperlipidemia)     HTN (hypertension)     HTN (hypertension)     ITP (idiopathic thrombocytopenic purpura)      PAST MEDICAL HISTORY:  CAD (coronary artery disease)     DM (diabetes mellitus)     HLD (hyperlipidemia)     HTN (hypertension)     HTN (hypertension)     ITP (idiopathic thrombocytopenic purpura)     Stage 3 chronic kidney disease     Waldenstrom macroglobulinemia

## 2024-05-21 RX ORDER — FAMOTIDINE 10 MG/ML
1 INJECTION INTRAVENOUS
Refills: 0 | DISCHARGE

## 2024-06-10 ENCOUNTER — OFFICE (OUTPATIENT)
Dept: URBAN - METROPOLITAN AREA CLINIC 90 | Facility: CLINIC | Age: 79
Setting detail: OPHTHALMOLOGY
End: 2024-06-10
Payer: COMMERCIAL

## 2024-06-10 VITALS — HEIGHT: 60 IN

## 2024-06-10 DIAGNOSIS — H02.032: ICD-10-CM

## 2024-06-10 DIAGNOSIS — E11.9: ICD-10-CM

## 2024-06-10 DIAGNOSIS — H25.11: ICD-10-CM

## 2024-06-10 DIAGNOSIS — H16.222: ICD-10-CM

## 2024-06-10 DIAGNOSIS — H35.373: ICD-10-CM

## 2024-06-10 DIAGNOSIS — H02.035: ICD-10-CM

## 2024-06-10 DIAGNOSIS — H35.033: ICD-10-CM

## 2024-06-10 PROCEDURE — 92014 COMPRE OPH EXAM EST PT 1/>: CPT | Performed by: OPHTHALMOLOGY

## 2024-06-10 ASSESSMENT — LID POSITION - COMMENTS: OD_COMMENTS: LAXITY

## 2024-06-10 ASSESSMENT — LID POSITION - ENTROPION
OS_ENTROPION: LLL
OD_ENTROPION: RLL

## 2024-06-10 ASSESSMENT — CONFRONTATIONAL VISUAL FIELD TEST (CVF)
OS_FINDINGS: FULL
OD_FINDINGS: FULL

## 2024-12-16 ENCOUNTER — OFFICE (OUTPATIENT)
Facility: LOCATION | Age: 79
Setting detail: OPHTHALMOLOGY
End: 2024-12-16
Payer: COMMERCIAL

## 2024-12-16 DIAGNOSIS — H02.035: ICD-10-CM

## 2024-12-16 DIAGNOSIS — H35.373: ICD-10-CM

## 2024-12-16 DIAGNOSIS — H02.032: ICD-10-CM

## 2024-12-16 DIAGNOSIS — H25.11: ICD-10-CM

## 2024-12-16 PROBLEM — H16.223 DRY EYE SYNDROME K SICCA; BOTH EYES: Status: ACTIVE | Noted: 2024-12-16

## 2024-12-16 PROCEDURE — 92012 INTRM OPH EXAM EST PATIENT: CPT | Performed by: OPHTHALMOLOGY

## 2024-12-16 PROCEDURE — 92134 CPTRZ OPH DX IMG PST SGM RTA: CPT | Performed by: OPHTHALMOLOGY

## 2024-12-16 ASSESSMENT — REFRACTION_MANIFEST
OD_SPHERE: PLANO
OD_VA1: 20/30
OS_AXIS: 038
OD_VA2: 20/20(J1+)
OD_ADD: +2.50
OS_CYLINDER: -0.50
OD_AXIS: 172
OD_ADD: +2.50
OS_AXIS: 175
OD_CYLINDER: -0.50
OS_SPHERE: -1.00
OS_AXIS: 175
OS_ADD: +2.50
OS_VA1: 20/20
OD_VA1: 20/30
OS_AXIS: 010
OS_SPHERE: -1.00
OS_VA2: 20/25(J1)
OS_ADD: +2.75
OD_SPHERE: -0.50
OS_VA2: 20/20
OD_SPHERE: -1.50
OS_CYLINDER: -0.25
OD_ADD: +2.50
OS_VA1: 20/20
OD_SPHERE: -0.25
OD_AXIS: 085
OD_CYLINDER: -0.50
OD_AXIS: 090
OS_CYLINDER: SPHERE
OD_SPHERE: -2.00
OD_VA1: 20/25-
OD_AXIS: 090
OS_VA1: 20/25
OD_AXIS: 090
OS_SPHERE: -1.00
OD_VA1: 20/30
OD_SPHERE: -1.75
OD_AXIS: 090
OS_VA1: 20/20-1
OS_ADD: +2.75
OD_ADD: +2.50
OS_CYLINDER: -0.50
OD_SPHERE: -0.50
OD_CYLINDER: -0.50
OD_VA2: 20/25(J1)
OS_ADD: +2.50
OS_ADD: +2.50
OD_ADD: +2.50
OS_SPHERE: -1.00
OD_CYLINDER: -0.50
OS_AXIS: 050
OS_ADD: +2.50
OS_VA2: 20/25(J1)
OS_ADD: +2.50
OD_VA1: 20/30
OD_VA2: 20/20
OD_VA1: 20/25
OS_CYLINDER: -0.50
OS_VA2: 20/25(J1)
OS_SPHERE: -1.00
OS_AXIS: 040
OS_VA2: 20/20(J1+)
OS_VA1: 20/25
OD_CYLINDER: -0.50
OD_VA2: 20/25(J1)
OD_AXIS: 110
OS_SPHERE: -0.50
OD_SPHERE: -0.50
OS_VA1: 20/20
OS_VA2: 20/25(J1)
OS_ADD: +2.50
OD_AXIS: 172
OD_VA2: 20/25(J1)
OD_CYLINDER: -0.50
OS_VA1: 20/20
OS_CYLINDER: -0.50
OS_CYLINDER: SPHERE
OD_CYLINDER: -0.50
OS_SPHERE: -0.50
OS_CYLINDER: -0.25
OD_CYLINDER: -0.50
OD_ADD: +2.75
OD_ADD: +2.50
OD_VA2: 20/25(J1)
OD_ADD: +3.00
OD_VA2: 20/20
OS_SPHERE: -1.00
OD_AXIS: 085
OS_SPHERE: -0.50
OS_VA2: 20/20
OS_CYLINDER: SPH
OD_VA1: 20/25
OD_SPHERE: -0.50
OD_CYLINDER: -0.75

## 2024-12-16 ASSESSMENT — KERATOMETRY
OS_K2POWER_DIOPTERS: 44.25
METHOD_AUTO_MANUAL: AUTO
OS_AXISANGLE_DEGREES: 114
OD_K2POWER_DIOPTERS: 44.50
OS_K1POWER_DIOPTERS: 44.00
OD_K1POWER_DIOPTERS: 44.00
OD_AXISANGLE_DEGREES: 008

## 2024-12-16 ASSESSMENT — VISUAL ACUITY
OS_BCVA: 20/50-2
OD_BCVA: 20/20-1

## 2024-12-16 ASSESSMENT — REFRACTION_CURRENTRX
OD_CYLINDER: -0.50
OD_SPHERE: -1.50
OS_AXIS: 097
OS_CYLINDER: -0.25
OS_SPHERE: -1.00
OD_OVR_VA: 20/
OS_CYLINDER: -0.25
OS_AXIS: 179
OD_OVR_VA: 20/
OD_SPHERE: -0.50
OS_CYLINDER: -0.25
OD_AXIS: 091
OS_ADD: +2.25
OS_OVR_VA: 20/
OD_AXIS: 090
OS_OVR_VA: 20/
OS_VPRISM_DIRECTION: SV
OD_VPRISM_DIRECTION: SV
OS_VPRISM_DIRECTION: SV
OS_AXIS: 002
OS_SPHERE: +2.00
OS_VPRISM_DIRECTION: SV
OS_SPHERE: -1.00
OD_VPRISM_DIRECTION: SV
OS_CYLINDER: -0.25
OD_CYLINDER: -0.50
OD_CYLINDER: -0.50
OD_AXIS: 084
OS_OVR_VA: 20/
OD_SPHERE: -0.50
OD_VPRISM_DIRECTION: SV
OD_AXIS: 086
OS_VPRISM_DIRECTION: SV
OD_ADD: +1.50
OD_AXIS: 094
OS_SPHERE: -1.00
OD_CYLINDER: -0.50
OS_AXIS: 172
OD_SPHERE: -1.50
OD_VPRISM_DIRECTION: SV
OD_SPHERE: +2.00
OD_OVR_VA: 20/
OS_AXIS: 180
OS_CYLINDER: SPH
OD_CYLINDER: -0.50
OS_SPHERE: -0.75

## 2024-12-16 ASSESSMENT — REFRACTION_AUTOREFRACTION
OS_SPHERE: -0.50
OS_AXIS: 054
OS_CYLINDER: -0.25
OD_CYLINDER: -1.25
OD_SPHERE: -1.50
OD_AXIS: 097

## 2024-12-16 ASSESSMENT — LID POSITION - ENTROPION
OS_ENTROPION: LLL
OD_ENTROPION: RLL

## 2024-12-16 ASSESSMENT — TONOMETRY
OS_IOP_MMHG: 12
OD_IOP_MMHG: 16

## 2024-12-16 ASSESSMENT — LID POSITION - COMMENTS: OD_COMMENTS: LAXITY

## 2024-12-16 ASSESSMENT — CONFRONTATIONAL VISUAL FIELD TEST (CVF)
OS_FINDINGS: FULL
OD_FINDINGS: FULL

## 2025-04-01 PROBLEM — H35.033 HYPERTENSIVE RETINOPATHY; BOTH EYES: Status: ACTIVE | Noted: 2025-04-01

## 2025-06-17 ENCOUNTER — OFFICE (OUTPATIENT)
Facility: LOCATION | Age: 80
Setting detail: OPHTHALMOLOGY
End: 2025-06-17
Payer: MEDICARE

## 2025-06-17 DIAGNOSIS — H35.373: ICD-10-CM

## 2025-06-17 DIAGNOSIS — H02.032: ICD-10-CM

## 2025-06-17 DIAGNOSIS — E11.9: ICD-10-CM

## 2025-06-17 DIAGNOSIS — H25.11: ICD-10-CM

## 2025-06-17 PROBLEM — H11.153 PINGUECULA: Status: ACTIVE | Noted: 2025-06-17

## 2025-06-17 PROCEDURE — 92014 COMPRE OPH EXAM EST PT 1/>: CPT | Performed by: OPHTHALMOLOGY

## 2025-06-17 ASSESSMENT — REFRACTION_MANIFEST
OD_SPHERE: -2.00
OS_ADD: +2.75
OS_SPHERE: -0.50
OD_SPHERE: -0.50
OD_SPHERE: -0.25
OD_SPHERE: -1.75
OS_ADD: +2.50
OS_SPHERE: -1.00
OS_ADD: +2.50
OS_CYLINDER: -0.50
OS_ADD: +2.50
OS_SPHERE: -1.00
OS_VA2: 20/25(J1)
OS_VA2: 20/25(J1)
OD_SPHERE: -0.50
OD_VA2: 20/25(J1)
OS_SPHERE: -0.50
OD_AXIS: 172
OD_ADD: +2.50
OD_AXIS: 090
OS_SPHERE: -1.00
OD_CYLINDER: -0.75
OD_AXIS: 085
OS_CYLINDER: -0.25
OD_VA1: 20/25-
OD_CYLINDER: -0.50
OD_AXIS: 172
OD_VA1: 20/25
OD_CYLINDER: -0.50
OD_VA2: 20/25(J1)
OS_VA1: 20/25
OS_CYLINDER: SPHERE
OD_VA1: 20/30
OS_VA1: 20/20
OS_CYLINDER: SPHERE
OD_SPHERE: PLANO
OS_SPHERE: -0.50
OS_VA2: 20/20
OD_AXIS: 090
OD_ADD: +2.75
OD_SPHERE: -0.50
OD_VA1: 20/30
OD_ADD: +2.50
OD_CYLINDER: -0.50
OD_ADD: +2.50
OS_AXIS: 050
OD_AXIS: 090
OD_ADD: +2.50
OS_VA2: 20/25(J1)
OD_AXIS: 110
OS_CYLINDER: -0.50
OS_VA1: 20/25
OS_ADD: +2.50
OS_AXIS: 038
OD_VA1: 20/25
OS_AXIS: 175
OD_VA1: 20/30
OS_VA1: 20/20
OD_SPHERE: -1.50
OS_CYLINDER: SPH
OS_VA1: 20/20
OS_CYLINDER: -0.50
OS_VA1: 20/20-1
OD_VA2: 20/20
OS_CYLINDER: -0.25
OD_VA2: 20/20(J1+)
OS_AXIS: 040
OD_VA2: 20/25(J1)
OS_ADD: +2.50
OD_CYLINDER: -0.50
OS_VA1: 20/20
OD_VA2: 20/25(J1)
OS_VA2: 20/25(J1)
OD_SPHERE: -0.50
OD_AXIS: 085
OS_SPHERE: -1.00
OS_AXIS: 010
OS_SPHERE: -1.00
OD_CYLINDER: -0.50
OD_ADD: +3.00
OD_CYLINDER: -0.50
OS_SPHERE: -1.00
OS_ADD: +2.50
OD_ADD: +2.50
OD_CYLINDER: -0.50
OD_CYLINDER: -0.50
OS_VA2: 20/20
OD_ADD: +2.50
OS_AXIS: 175
OD_AXIS: 090
OD_VA2: 20/20
OS_CYLINDER: -0.50
OS_ADD: +2.75
OS_VA2: 20/20(J1+)
OD_VA1: 20/30

## 2025-06-17 ASSESSMENT — REFRACTION_CURRENTRX
OD_AXIS: 086
OS_OVR_VA: 20/
OS_SPHERE: -1.00
OD_SPHERE: -1.50
OD_CYLINDER: -0.50
OS_AXIS: 179
OD_VPRISM_DIRECTION: SV
OD_ADD: +2.75
OD_AXIS: 084
OS_VPRISM_DIRECTION: SV
OS_AXIS: 051
OS_CYLINDER: -0.25
OS_SPHERE: +2.00
OD_SPHERE: -1.50
OS_AXIS: 180
OD_VPRISM_DIRECTION: PROGS
OS_CYLINDER: -0.25
OD_AXIS: 090
OD_CYLINDER: -0.50
OS_AXIS: 097
OS_CYLINDER: -0.25
OD_SPHERE: -0.50
OD_VPRISM_DIRECTION: SV
OS_CYLINDER: -0.25
OD_CYLINDER: -0.50
OD_AXIS: 096
OD_VPRISM_DIRECTION: SV
OS_CYLINDER: SPH
OS_VPRISM_DIRECTION: PROGS
OS_SPHERE: -1.00
OD_CYLINDER: -0.50
OD_AXIS: 091
OS_AXIS: 002
OS_ADD: +2.75
OS_SPHERE: -1.00
OD_OVR_VA: 20/
OS_ADD: +2.25
OD_OVR_VA: 20/
OS_CYLINDER: -0.25
OS_OVR_VA: 20/
OD_SPHERE: -0.50
OS_SPHERE: -1.00
OD_CYLINDER: -0.50
OD_SPHERE: -2.25
OD_SPHERE: +2.00
OD_AXIS: 094
OS_VPRISM_DIRECTION: SV
OS_VPRISM_DIRECTION: SV
OD_ADD: +1.50
OS_OVR_VA: 20/
OD_OVR_VA: 20/
OD_CYLINDER: -0.50
OS_VPRISM_DIRECTION: SV
OD_VPRISM_DIRECTION: SV
OS_SPHERE: -0.75
OS_AXIS: 172

## 2025-06-17 ASSESSMENT — VISUAL ACUITY
OS_BCVA: 20/30
OD_BCVA: 20/25-2

## 2025-06-17 ASSESSMENT — REFRACTION_AUTOREFRACTION
OS_SPHERE: -0.25
OD_CYLINDER: -0.75
OS_CYLINDER: -0.25
OD_SPHERE: -2.25
OD_AXIS: 096
OS_AXIS: 050

## 2025-06-17 ASSESSMENT — LID POSITION - LOWER LID LAG: OS_LOWER_LID_LAG: LLL

## 2025-06-17 ASSESSMENT — LID POSITION - ENTROPION
OS_ENTROPION: LLL
OD_ENTROPION: RLL

## 2025-06-17 ASSESSMENT — LID POSITION - COMMENTS: OD_COMMENTS: LAXITY

## 2025-06-17 ASSESSMENT — KERATOMETRY
METHOD_AUTO_MANUAL: AUTO
OS_AXISANGLE_DEGREES: 119
OD_K1POWER_DIOPTERS: 44.25
OD_AXISANGLE_DEGREES: 044
OS_K2POWER_DIOPTERS: 44.50
OD_K2POWER_DIOPTERS: 44.50
OS_K1POWER_DIOPTERS: 44.00

## 2025-06-17 ASSESSMENT — LID POSITION - ECTROPION: OS_ECTROPION: LLL

## 2025-06-17 ASSESSMENT — CONFRONTATIONAL VISUAL FIELD TEST (CVF)
OS_FINDINGS: FULL
OD_FINDINGS: FULL

## 2025-06-17 ASSESSMENT — TONOMETRY
OS_IOP_MMHG: 16
OD_IOP_MMHG: 17

## 2025-07-03 ENCOUNTER — NON-APPOINTMENT (OUTPATIENT)
Age: 80
End: 2025-07-03

## 2025-09-15 ENCOUNTER — APPOINTMENT (OUTPATIENT)
Dept: CT IMAGING | Facility: IMAGING CENTER | Age: 80
End: 2025-09-15
Payer: MEDICARE

## 2025-09-15 PROCEDURE — 70480 CT ORBIT/EAR/FOSSA W/O DYE: CPT | Mod: 26
